# Patient Record
Sex: FEMALE | Race: WHITE | NOT HISPANIC OR LATINO | Employment: OTHER | URBAN - METROPOLITAN AREA
[De-identification: names, ages, dates, MRNs, and addresses within clinical notes are randomized per-mention and may not be internally consistent; named-entity substitution may affect disease eponyms.]

---

## 2017-01-09 ENCOUNTER — GENERIC CONVERSION - ENCOUNTER (OUTPATIENT)
Dept: OTHER | Facility: OTHER | Age: 82
End: 2017-01-09

## 2017-02-07 ENCOUNTER — ALLSCRIPTS OFFICE VISIT (OUTPATIENT)
Dept: OTHER | Facility: OTHER | Age: 82
End: 2017-02-07

## 2017-02-07 DIAGNOSIS — R49.0 DYSPHONIA: ICD-10-CM

## 2017-02-07 DIAGNOSIS — R13.10 DYSPHAGIA: ICD-10-CM

## 2017-02-21 ENCOUNTER — ALLSCRIPTS OFFICE VISIT (OUTPATIENT)
Dept: OTHER | Facility: OTHER | Age: 82
End: 2017-02-21

## 2017-03-21 ENCOUNTER — ALLSCRIPTS OFFICE VISIT (OUTPATIENT)
Dept: OTHER | Facility: OTHER | Age: 82
End: 2017-03-21

## 2017-04-01 ENCOUNTER — TRANSCRIBE ORDERS (OUTPATIENT)
Dept: ADMINISTRATIVE | Facility: HOSPITAL | Age: 82
End: 2017-04-01

## 2017-04-01 ENCOUNTER — APPOINTMENT (OUTPATIENT)
Dept: LAB | Facility: MEDICAL CENTER | Age: 82
End: 2017-04-01
Payer: MEDICARE

## 2017-04-01 DIAGNOSIS — E03.9 UNSPECIFIED HYPOTHYROIDISM: Primary | ICD-10-CM

## 2017-04-01 DIAGNOSIS — E03.9 UNSPECIFIED HYPOTHYROIDISM: ICD-10-CM

## 2017-04-01 LAB
T4 FREE SERPL-MCNC: 1.41 NG/DL (ref 0.76–1.46)
TSH SERPL DL<=0.05 MIU/L-ACNC: 2.1 UIU/ML (ref 0.36–3.74)

## 2017-04-01 PROCEDURE — 36415 COLL VENOUS BLD VENIPUNCTURE: CPT

## 2017-04-01 PROCEDURE — 84443 ASSAY THYROID STIM HORMONE: CPT

## 2017-04-01 PROCEDURE — 84439 ASSAY OF FREE THYROXINE: CPT

## 2017-04-11 ENCOUNTER — ALLSCRIPTS OFFICE VISIT (OUTPATIENT)
Dept: OTHER | Facility: OTHER | Age: 82
End: 2017-04-11

## 2017-05-09 ENCOUNTER — ALLSCRIPTS OFFICE VISIT (OUTPATIENT)
Dept: OTHER | Facility: OTHER | Age: 82
End: 2017-05-09

## 2017-06-01 ENCOUNTER — GENERIC CONVERSION - ENCOUNTER (OUTPATIENT)
Dept: OTHER | Facility: OTHER | Age: 82
End: 2017-06-01

## 2017-06-29 ENCOUNTER — OFFICE VISIT (OUTPATIENT)
Dept: URGENT CARE | Facility: MEDICAL CENTER | Age: 82
End: 2017-06-29
Payer: MEDICARE

## 2017-06-29 PROCEDURE — 99213 OFFICE O/P EST LOW 20 MIN: CPT

## 2017-06-29 PROCEDURE — G0463 HOSPITAL OUTPT CLINIC VISIT: HCPCS

## 2017-07-18 ENCOUNTER — ALLSCRIPTS OFFICE VISIT (OUTPATIENT)
Dept: OTHER | Facility: OTHER | Age: 82
End: 2017-07-18

## 2017-08-04 ENCOUNTER — HOSPITAL ENCOUNTER (INPATIENT)
Facility: HOSPITAL | Age: 82
LOS: 4 days | Discharge: NURSING, REHAB OR LONG-TERM CARE | DRG: 310 | End: 2017-08-08
Attending: EMERGENCY MEDICINE | Admitting: ANESTHESIOLOGY
Payer: MEDICARE

## 2017-08-04 ENCOUNTER — APPOINTMENT (EMERGENCY)
Dept: RADIOLOGY | Facility: HOSPITAL | Age: 82
DRG: 310 | End: 2017-08-04
Payer: MEDICARE

## 2017-08-04 DIAGNOSIS — IMO0002 ULCERATION: ICD-10-CM

## 2017-08-04 DIAGNOSIS — I48.91 ATRIAL FIBRILLATION WITH RAPID VENTRICULAR RESPONSE (HCC): ICD-10-CM

## 2017-08-04 DIAGNOSIS — R06.02 SOB (SHORTNESS OF BREATH): Primary | ICD-10-CM

## 2017-08-04 LAB
ALBUMIN SERPL BCP-MCNC: 3.2 G/DL (ref 3.5–5)
ALP SERPL-CCNC: 81 U/L (ref 46–116)
ALT SERPL W P-5'-P-CCNC: 20 U/L (ref 12–78)
ANION GAP SERPL CALCULATED.3IONS-SCNC: 6 MMOL/L (ref 4–13)
APTT PPP: 28 SECONDS (ref 23–35)
APTT PPP: 30 SECONDS (ref 23–35)
AST SERPL W P-5'-P-CCNC: 15 U/L (ref 5–45)
BACTERIA UR QL AUTO: ABNORMAL /HPF
BASOPHILS # BLD AUTO: 0.02 THOUSANDS/ΜL (ref 0–0.1)
BASOPHILS NFR BLD AUTO: 0 % (ref 0–1)
BILIRUB SERPL-MCNC: 0.5 MG/DL (ref 0.2–1)
BILIRUB UR QL STRIP: ABNORMAL
BUN SERPL-MCNC: 14 MG/DL (ref 5–25)
CALCIUM SERPL-MCNC: 9 MG/DL (ref 8.3–10.1)
CHLORIDE SERPL-SCNC: 105 MMOL/L (ref 100–108)
CLARITY UR: ABNORMAL
CO2 SERPL-SCNC: 29 MMOL/L (ref 21–32)
COLOR UR: YELLOW
CREAT SERPL-MCNC: 0.6 MG/DL (ref 0.6–1.3)
EOSINOPHIL # BLD AUTO: 0.09 THOUSAND/ΜL (ref 0–0.61)
EOSINOPHIL NFR BLD AUTO: 1 % (ref 0–6)
ERYTHROCYTE [DISTWIDTH] IN BLOOD BY AUTOMATED COUNT: 13.8 % (ref 11.6–15.1)
ERYTHROCYTE [DISTWIDTH] IN BLOOD BY AUTOMATED COUNT: 13.9 % (ref 11.6–15.1)
GFR SERPL CREATININE-BSD FRML MDRD: 81 ML/MIN/1.73SQ M
GLUCOSE SERPL-MCNC: 111 MG/DL (ref 65–140)
GLUCOSE UR STRIP-MCNC: NEGATIVE MG/DL
HCT VFR BLD AUTO: 37.2 % (ref 34.8–46.1)
HCT VFR BLD AUTO: 37.2 % (ref 34.8–46.1)
HGB BLD-MCNC: 11.6 G/DL (ref 11.5–15.4)
HGB BLD-MCNC: 11.6 G/DL (ref 11.5–15.4)
HGB UR QL STRIP.AUTO: NEGATIVE
HOLD SPECIMEN: NORMAL
INR PPP: 1.03 (ref 0.86–1.16)
INR PPP: 1.05 (ref 0.86–1.16)
KETONES UR STRIP-MCNC: ABNORMAL MG/DL
LEUKOCYTE ESTERASE UR QL STRIP: ABNORMAL
LYMPHOCYTES # BLD AUTO: 1.99 THOUSANDS/ΜL (ref 0.6–4.47)
LYMPHOCYTES NFR BLD AUTO: 29 % (ref 14–44)
MAGNESIUM SERPL-MCNC: 1.9 MG/DL (ref 1.6–2.6)
MCH RBC QN AUTO: 30 PG (ref 26.8–34.3)
MCH RBC QN AUTO: 30 PG (ref 26.8–34.3)
MCHC RBC AUTO-ENTMCNC: 31.2 G/DL (ref 31.4–37.4)
MCHC RBC AUTO-ENTMCNC: 31.2 G/DL (ref 31.4–37.4)
MCV RBC AUTO: 96 FL (ref 82–98)
MCV RBC AUTO: 96 FL (ref 82–98)
MONOCYTES # BLD AUTO: 0.61 THOUSAND/ΜL (ref 0.17–1.22)
MONOCYTES NFR BLD AUTO: 9 % (ref 4–12)
NEUTROPHILS # BLD AUTO: 4.11 THOUSANDS/ΜL (ref 1.85–7.62)
NEUTS SEG NFR BLD AUTO: 60 % (ref 43–75)
NITRITE UR QL STRIP: NEGATIVE
NON-SQ EPI CELLS URNS QL MICRO: ABNORMAL /HPF
NRBC BLD AUTO-RTO: 0 /100 WBCS
NT-PROBNP SERPL-MCNC: 1088 PG/ML
PH UR STRIP.AUTO: 5.5 [PH] (ref 4.5–8)
PHOSPHATE SERPL-MCNC: 3.9 MG/DL (ref 2.3–4.1)
PLATELET # BLD AUTO: 197 THOUSANDS/UL (ref 149–390)
PLATELET # BLD AUTO: 198 THOUSANDS/UL (ref 149–390)
PMV BLD AUTO: 10.5 FL (ref 8.9–12.7)
PMV BLD AUTO: 10.8 FL (ref 8.9–12.7)
POTASSIUM SERPL-SCNC: 3.8 MMOL/L (ref 3.5–5.3)
PROT SERPL-MCNC: 6.5 G/DL (ref 6.4–8.2)
PROT UR STRIP-MCNC: ABNORMAL MG/DL
PROTHROMBIN TIME: 13.7 SECONDS (ref 12.1–14.4)
PROTHROMBIN TIME: 13.9 SECONDS (ref 12.1–14.4)
RBC # BLD AUTO: 3.87 MILLION/UL (ref 3.81–5.12)
RBC # BLD AUTO: 3.87 MILLION/UL (ref 3.81–5.12)
RBC #/AREA URNS AUTO: ABNORMAL /HPF
SODIUM SERPL-SCNC: 140 MMOL/L (ref 136–145)
SP GR UR STRIP.AUTO: >=1.03 (ref 1–1.03)
TROPONIN I SERPL-MCNC: 0.02 NG/ML
TSH SERPL DL<=0.05 MIU/L-ACNC: 1.52 UIU/ML (ref 0.36–3.74)
UROBILINOGEN UR QL STRIP.AUTO: 0.2 E.U./DL
WBC # BLD AUTO: 6.1 THOUSAND/UL (ref 4.31–10.16)
WBC # BLD AUTO: 6.84 THOUSAND/UL (ref 4.31–10.16)
WBC #/AREA URNS AUTO: ABNORMAL /HPF

## 2017-08-04 PROCEDURE — 84100 ASSAY OF PHOSPHORUS: CPT | Performed by: EMERGENCY MEDICINE

## 2017-08-04 PROCEDURE — 84443 ASSAY THYROID STIM HORMONE: CPT | Performed by: EMERGENCY MEDICINE

## 2017-08-04 PROCEDURE — 85610 PROTHROMBIN TIME: CPT | Performed by: EMERGENCY MEDICINE

## 2017-08-04 PROCEDURE — 85610 PROTHROMBIN TIME: CPT | Performed by: NURSE PRACTITIONER

## 2017-08-04 PROCEDURE — 83880 ASSAY OF NATRIURETIC PEPTIDE: CPT | Performed by: EMERGENCY MEDICINE

## 2017-08-04 PROCEDURE — 93005 ELECTROCARDIOGRAM TRACING: CPT | Performed by: NURSE PRACTITIONER

## 2017-08-04 PROCEDURE — 93005 ELECTROCARDIOGRAM TRACING: CPT

## 2017-08-04 PROCEDURE — 99285 EMERGENCY DEPT VISIT HI MDM: CPT

## 2017-08-04 PROCEDURE — 84484 ASSAY OF TROPONIN QUANT: CPT | Performed by: NURSE PRACTITIONER

## 2017-08-04 PROCEDURE — 80053 COMPREHEN METABOLIC PANEL: CPT

## 2017-08-04 PROCEDURE — 85730 THROMBOPLASTIN TIME PARTIAL: CPT | Performed by: EMERGENCY MEDICINE

## 2017-08-04 PROCEDURE — 84484 ASSAY OF TROPONIN QUANT: CPT | Performed by: EMERGENCY MEDICINE

## 2017-08-04 PROCEDURE — 85025 COMPLETE CBC W/AUTO DIFF WBC: CPT

## 2017-08-04 PROCEDURE — 71010 HB CHEST X-RAY 1 VIEW FRONTAL (PORTABLE): CPT

## 2017-08-04 PROCEDURE — 83735 ASSAY OF MAGNESIUM: CPT | Performed by: EMERGENCY MEDICINE

## 2017-08-04 PROCEDURE — 81001 URINALYSIS AUTO W/SCOPE: CPT | Performed by: EMERGENCY MEDICINE

## 2017-08-04 PROCEDURE — 85730 THROMBOPLASTIN TIME PARTIAL: CPT | Performed by: NURSE PRACTITIONER

## 2017-08-04 PROCEDURE — 36415 COLL VENOUS BLD VENIPUNCTURE: CPT

## 2017-08-04 PROCEDURE — 96376 TX/PRO/DX INJ SAME DRUG ADON: CPT

## 2017-08-04 PROCEDURE — 85027 COMPLETE CBC AUTOMATED: CPT | Performed by: NURSE PRACTITIONER

## 2017-08-04 PROCEDURE — 96365 THER/PROPH/DIAG IV INF INIT: CPT

## 2017-08-04 RX ORDER — SODIUM CHLORIDE 9 MG/ML
100 INJECTION, SOLUTION INTRAVENOUS CONTINUOUS
Status: DISCONTINUED | OUTPATIENT
Start: 2017-08-04 | End: 2017-08-05

## 2017-08-04 RX ORDER — HEPARIN SODIUM 10000 [USP'U]/100ML
3-20 INJECTION, SOLUTION INTRAVENOUS
Status: DISCONTINUED | OUTPATIENT
Start: 2017-08-04 | End: 2017-08-05

## 2017-08-04 RX ORDER — ASPIRIN 81 MG/1
162 TABLET ORAL DAILY
Status: DISCONTINUED | OUTPATIENT
Start: 2017-08-04 | End: 2017-08-08 | Stop reason: HOSPADM

## 2017-08-04 RX ORDER — AMLODIPINE BESYLATE 5 MG/1
10 TABLET ORAL DAILY
Status: DISCONTINUED | OUTPATIENT
Start: 2017-08-05 | End: 2017-08-04

## 2017-08-04 RX ORDER — BRIMONIDINE TARTRATE, TIMOLOL MALEATE 2; 5 MG/ML; MG/ML
1 SOLUTION/ DROPS OPHTHALMIC 2 TIMES DAILY
Status: DISCONTINUED | OUTPATIENT
Start: 2017-08-04 | End: 2017-08-08 | Stop reason: HOSPADM

## 2017-08-04 RX ORDER — BENZONATATE 100 MG/1
100 CAPSULE ORAL 3 TIMES DAILY PRN
Status: DISCONTINUED | OUTPATIENT
Start: 2017-08-04 | End: 2017-08-08 | Stop reason: HOSPADM

## 2017-08-04 RX ORDER — OXYCODONE HCL 5 MG/5 ML
5 SOLUTION, ORAL ORAL EVERY 6 HOURS PRN
Status: DISCONTINUED | OUTPATIENT
Start: 2017-08-04 | End: 2017-08-08 | Stop reason: HOSPADM

## 2017-08-04 RX ORDER — BENZONATATE 100 MG/1
100 CAPSULE ORAL 3 TIMES DAILY PRN
COMMUNITY
End: 2017-09-05 | Stop reason: HOSPADM

## 2017-08-04 RX ORDER — HYDROCODONE POLISTIREX AND CHLORPHENIRAMINE POLISTIREX 10; 8 MG/5ML; MG/5ML
5 SUSPENSION, EXTENDED RELEASE ORAL EVERY 12 HOURS PRN
Status: DISCONTINUED | OUTPATIENT
Start: 2017-08-04 | End: 2017-08-08 | Stop reason: HOSPADM

## 2017-08-04 RX ORDER — LORAZEPAM 2 MG/ML
1 INJECTION INTRAMUSCULAR ONCE
Status: COMPLETED | OUTPATIENT
Start: 2017-08-04 | End: 2017-08-04

## 2017-08-04 RX ORDER — HEPARIN SODIUM 1000 [USP'U]/ML
4000 INJECTION, SOLUTION INTRAVENOUS; SUBCUTANEOUS AS NEEDED
Status: DISCONTINUED | OUTPATIENT
Start: 2017-08-04 | End: 2017-08-05

## 2017-08-04 RX ORDER — HEPARIN SODIUM 1000 [USP'U]/ML
2000 INJECTION, SOLUTION INTRAVENOUS; SUBCUTANEOUS AS NEEDED
Status: DISCONTINUED | OUTPATIENT
Start: 2017-08-04 | End: 2017-08-05

## 2017-08-04 RX ORDER — HYDROCODONE BITARTRATE AND ACETAMINOPHEN 5; 325 MG/1; MG/1
1 TABLET ORAL EVERY 6 HOURS PRN
Status: DISCONTINUED | OUTPATIENT
Start: 2017-08-04 | End: 2017-08-04

## 2017-08-04 RX ORDER — NORTRIPTYLINE HYDROCHLORIDE 25 MG/1
25 CAPSULE ORAL
Status: DISCONTINUED | OUTPATIENT
Start: 2017-08-04 | End: 2017-08-08 | Stop reason: HOSPADM

## 2017-08-04 RX ORDER — LEVOTHYROXINE SODIUM 0.12 MG/1
125 TABLET ORAL DAILY
Status: DISCONTINUED | OUTPATIENT
Start: 2017-08-05 | End: 2017-08-08 | Stop reason: HOSPADM

## 2017-08-04 RX ORDER — DILTIAZEM HYDROCHLORIDE 5 MG/ML
20 INJECTION INTRAVENOUS ONCE
Status: COMPLETED | OUTPATIENT
Start: 2017-08-04 | End: 2017-08-04

## 2017-08-04 RX ORDER — BRIMONIDINE TARTRATE, TIMOLOL MALEATE 2; 5 MG/ML; MG/ML
1 SOLUTION/ DROPS OPHTHALMIC 2 TIMES DAILY
Status: DISCONTINUED | OUTPATIENT
Start: 2017-08-04 | End: 2017-08-04

## 2017-08-04 RX ORDER — MIRTAZAPINE 15 MG/1
15 TABLET, FILM COATED ORAL
Status: DISCONTINUED | OUTPATIENT
Start: 2017-08-04 | End: 2017-08-08 | Stop reason: HOSPADM

## 2017-08-04 RX ADMIN — ASPIRIN 162 MG: 81 TABLET, COATED ORAL at 17:39

## 2017-08-04 RX ADMIN — DILTIAZEM HYDROCHLORIDE 30 MG: 30 TABLET, FILM COATED ORAL at 17:25

## 2017-08-04 RX ADMIN — HYDROCODONE POLISTIREX AND CHLORPHENIRAMINE POLISTIREX 5 ML: 10; 8 SUSPENSION, EXTENDED RELEASE ORAL at 21:53

## 2017-08-04 RX ADMIN — HEPARIN SODIUM AND DEXTROSE 12 UNITS/KG/HR: 10000; 5 INJECTION INTRAVENOUS at 17:25

## 2017-08-04 RX ADMIN — LORAZEPAM 1 MG: 2 INJECTION INTRAMUSCULAR; INTRAVENOUS at 15:03

## 2017-08-04 RX ADMIN — DILTIAZEM HYDROCHLORIDE 30 MG: 30 TABLET, FILM COATED ORAL at 14:11

## 2017-08-04 RX ADMIN — SODIUM CHLORIDE 100 ML/HR: 0.9 INJECTION, SOLUTION INTRAVENOUS at 17:17

## 2017-08-04 RX ADMIN — DILTIAZEM HYDROCHLORIDE 20 MG: 5 INJECTION INTRAVENOUS at 11:58

## 2017-08-04 RX ADMIN — BRIMONIDINE TARTRATE, TIMOLOL MALEATE 1 DROP: 2; 5 SOLUTION/ DROPS OPHTHALMIC at 22:05

## 2017-08-04 RX ADMIN — DILTIAZEM HYDROCHLORIDE 5 MG/HR: 5 INJECTION INTRAVENOUS at 12:37

## 2017-08-05 LAB
ANION GAP SERPL CALCULATED.3IONS-SCNC: 9 MMOL/L (ref 4–13)
APTT PPP: 53 SECONDS (ref 23–35)
APTT PPP: 53 SECONDS (ref 23–35)
ATRIAL RATE: 150 BPM
ATRIAL RATE: 77 BPM
BUN SERPL-MCNC: 14 MG/DL (ref 5–25)
CALCIUM SERPL-MCNC: 8.4 MG/DL (ref 8.3–10.1)
CHLORIDE SERPL-SCNC: 106 MMOL/L (ref 100–108)
CO2 SERPL-SCNC: 24 MMOL/L (ref 21–32)
CREAT SERPL-MCNC: 0.55 MG/DL (ref 0.6–1.3)
ERYTHROCYTE [DISTWIDTH] IN BLOOD BY AUTOMATED COUNT: 13.7 % (ref 11.6–15.1)
GFR SERPL CREATININE-BSD FRML MDRD: 83 ML/MIN/1.73SQ M
GLUCOSE SERPL-MCNC: 101 MG/DL (ref 65–140)
HCT VFR BLD AUTO: 35.2 % (ref 34.8–46.1)
HGB BLD-MCNC: 10.6 G/DL (ref 11.5–15.4)
MAGNESIUM SERPL-MCNC: 2.1 MG/DL (ref 1.6–2.6)
MCH RBC QN AUTO: 29.9 PG (ref 26.8–34.3)
MCHC RBC AUTO-ENTMCNC: 30.1 G/DL (ref 31.4–37.4)
MCV RBC AUTO: 99 FL (ref 82–98)
P AXIS: 51 DEGREES
PLATELET # BLD AUTO: 149 THOUSANDS/UL (ref 149–390)
PMV BLD AUTO: 10.9 FL (ref 8.9–12.7)
POTASSIUM SERPL-SCNC: 3.7 MMOL/L (ref 3.5–5.3)
PR INTERVAL: 278 MS
QRS AXIS: 103 DEGREES
QRS AXIS: 109 DEGREES
QRSD INTERVAL: 108 MS
QRSD INTERVAL: 110 MS
QT INTERVAL: 316 MS
QT INTERVAL: 408 MS
QTC INTERVAL: 461 MS
QTC INTERVAL: 480 MS
RBC # BLD AUTO: 3.54 MILLION/UL (ref 3.81–5.12)
SODIUM SERPL-SCNC: 139 MMOL/L (ref 136–145)
T WAVE AXIS: 50 DEGREES
T WAVE AXIS: 52 DEGREES
VENTRICULAR RATE: 139 BPM
VENTRICULAR RATE: 77 BPM
WBC # BLD AUTO: 4.99 THOUSAND/UL (ref 4.31–10.16)

## 2017-08-05 PROCEDURE — 85027 COMPLETE CBC AUTOMATED: CPT | Performed by: PHYSICIAN ASSISTANT

## 2017-08-05 PROCEDURE — 80048 BASIC METABOLIC PNL TOTAL CA: CPT | Performed by: PHYSICIAN ASSISTANT

## 2017-08-05 PROCEDURE — 85730 THROMBOPLASTIN TIME PARTIAL: CPT | Performed by: ANESTHESIOLOGY

## 2017-08-05 PROCEDURE — 83735 ASSAY OF MAGNESIUM: CPT | Performed by: PHYSICIAN ASSISTANT

## 2017-08-05 PROCEDURE — 85730 THROMBOPLASTIN TIME PARTIAL: CPT | Performed by: PHYSICIAN ASSISTANT

## 2017-08-05 RX ORDER — POLYETHYLENE GLYCOL 3350 17 G/17G
17 POWDER, FOR SOLUTION ORAL DAILY PRN
Status: DISCONTINUED | OUTPATIENT
Start: 2017-08-05 | End: 2017-08-08 | Stop reason: HOSPADM

## 2017-08-05 RX ADMIN — POLYETHYLENE GLYCOL 3350 17 G: 17 POWDER, FOR SOLUTION ORAL at 12:33

## 2017-08-05 RX ADMIN — DILTIAZEM HYDROCHLORIDE 30 MG: 30 TABLET, FILM COATED ORAL at 06:27

## 2017-08-05 RX ADMIN — BRIMONIDINE TARTRATE, TIMOLOL MALEATE 1 DROP: 2; 5 SOLUTION/ DROPS OPHTHALMIC at 17:42

## 2017-08-05 RX ADMIN — DILTIAZEM HYDROCHLORIDE 30 MG: 30 TABLET, FILM COATED ORAL at 17:42

## 2017-08-05 RX ADMIN — OXYCODONE HYDROCHLORIDE 5 MG: 5 SOLUTION ORAL at 11:31

## 2017-08-05 RX ADMIN — NORTRIPTYLINE HYDROCHLORIDE 25 MG: 25 CAPSULE ORAL at 21:16

## 2017-08-05 RX ADMIN — DILTIAZEM HYDROCHLORIDE 30 MG: 30 TABLET, FILM COATED ORAL at 00:15

## 2017-08-05 RX ADMIN — BRIMONIDINE TARTRATE, TIMOLOL MALEATE 1 DROP: 2; 5 SOLUTION/ DROPS OPHTHALMIC at 08:18

## 2017-08-05 RX ADMIN — ASPIRIN 162 MG: 81 TABLET, COATED ORAL at 08:17

## 2017-08-05 RX ADMIN — SODIUM CHLORIDE 100 ML/HR: 0.9 INJECTION, SOLUTION INTRAVENOUS at 03:13

## 2017-08-05 RX ADMIN — MIRTAZAPINE 15 MG: 15 TABLET, FILM COATED ORAL at 21:16

## 2017-08-05 RX ADMIN — DILTIAZEM HYDROCHLORIDE 30 MG: 30 TABLET, FILM COATED ORAL at 11:31

## 2017-08-05 RX ADMIN — LEVOTHYROXINE SODIUM 125 MCG: 125 TABLET ORAL at 06:27

## 2017-08-05 RX ADMIN — HEPARIN SODIUM 2000 UNITS: 1000 INJECTION, SOLUTION INTRAVENOUS; SUBCUTANEOUS at 00:57

## 2017-08-06 ENCOUNTER — APPOINTMENT (INPATIENT)
Dept: NON INVASIVE DIAGNOSTICS | Facility: HOSPITAL | Age: 82
DRG: 310 | End: 2017-08-06
Payer: MEDICARE

## 2017-08-06 LAB
ANION GAP SERPL CALCULATED.3IONS-SCNC: 7 MMOL/L (ref 4–13)
BUN SERPL-MCNC: 12 MG/DL (ref 5–25)
CALCIUM SERPL-MCNC: 9.1 MG/DL (ref 8.3–10.1)
CHLORIDE SERPL-SCNC: 105 MMOL/L (ref 100–108)
CO2 SERPL-SCNC: 28 MMOL/L (ref 21–32)
CREAT SERPL-MCNC: 0.54 MG/DL (ref 0.6–1.3)
GFR SERPL CREATININE-BSD FRML MDRD: 83 ML/MIN/1.73SQ M
GLUCOSE SERPL-MCNC: 105 MG/DL (ref 65–140)
MAGNESIUM SERPL-MCNC: 1.9 MG/DL (ref 1.6–2.6)
POTASSIUM SERPL-SCNC: 3.6 MMOL/L (ref 3.5–5.3)
SODIUM SERPL-SCNC: 140 MMOL/L (ref 136–145)

## 2017-08-06 PROCEDURE — 80048 BASIC METABOLIC PNL TOTAL CA: CPT | Performed by: NURSE PRACTITIONER

## 2017-08-06 PROCEDURE — 92610 EVALUATE SWALLOWING FUNCTION: CPT

## 2017-08-06 PROCEDURE — 93306 TTE W/DOPPLER COMPLETE: CPT

## 2017-08-06 PROCEDURE — 94664 DEMO&/EVAL PT USE INHALER: CPT

## 2017-08-06 PROCEDURE — 94760 N-INVAS EAR/PLS OXIMETRY 1: CPT

## 2017-08-06 PROCEDURE — 83735 ASSAY OF MAGNESIUM: CPT | Performed by: NURSE PRACTITIONER

## 2017-08-06 RX ORDER — SODIUM CHLORIDE FOR INHALATION 0.9 %
3 VIAL, NEBULIZER (ML) INHALATION EVERY 8 HOURS PRN
Status: DISCONTINUED | OUTPATIENT
Start: 2017-08-06 | End: 2017-08-08 | Stop reason: HOSPADM

## 2017-08-06 RX ORDER — DILTIAZEM HYDROCHLORIDE 5 MG/ML
10 INJECTION INTRAVENOUS ONCE
Status: COMPLETED | OUTPATIENT
Start: 2017-08-06 | End: 2017-08-06

## 2017-08-06 RX ORDER — MAGNESIUM SULFATE HEPTAHYDRATE 40 MG/ML
2 INJECTION, SOLUTION INTRAVENOUS ONCE
Status: COMPLETED | OUTPATIENT
Start: 2017-08-06 | End: 2017-08-07

## 2017-08-06 RX ORDER — LEVALBUTEROL 1.25 MG/.5ML
1.25 SOLUTION, CONCENTRATE RESPIRATORY (INHALATION) EVERY 8 HOURS PRN
Status: DISCONTINUED | OUTPATIENT
Start: 2017-08-06 | End: 2017-08-07

## 2017-08-06 RX ADMIN — LEVOTHYROXINE SODIUM 125 MCG: 125 TABLET ORAL at 05:33

## 2017-08-06 RX ADMIN — DILTIAZEM HYDROCHLORIDE 10 MG: 5 INJECTION INTRAVENOUS at 22:22

## 2017-08-06 RX ADMIN — HYDROCODONE POLISTIREX AND CHLORPHENIRAMINE POLISTIREX 5 ML: 10; 8 SUSPENSION, EXTENDED RELEASE ORAL at 23:05

## 2017-08-06 RX ADMIN — LEVALBUTEROL HYDROCHLORIDE 1.25 MG: 1.25 SOLUTION, CONCENTRATE RESPIRATORY (INHALATION) at 22:36

## 2017-08-06 RX ADMIN — MAGNESIUM SULFATE HEPTAHYDRATE 2 G: 40 INJECTION, SOLUTION INTRAVENOUS at 22:46

## 2017-08-06 RX ADMIN — BRIMONIDINE TARTRATE, TIMOLOL MALEATE 1 DROP: 2; 5 SOLUTION/ DROPS OPHTHALMIC at 17:00

## 2017-08-06 RX ADMIN — OXYCODONE HYDROCHLORIDE 5 MG: 5 SOLUTION ORAL at 16:56

## 2017-08-06 RX ADMIN — DILTIAZEM HYDROCHLORIDE 30 MG: 30 TABLET, FILM COATED ORAL at 12:13

## 2017-08-06 RX ADMIN — ENOXAPARIN SODIUM 40 MG: 40 INJECTION SUBCUTANEOUS at 14:13

## 2017-08-06 RX ADMIN — BRIMONIDINE TARTRATE, TIMOLOL MALEATE 1 DROP: 2; 5 SOLUTION/ DROPS OPHTHALMIC at 08:32

## 2017-08-06 RX ADMIN — LIDOCAINE HYDROCHLORIDE 10 ML: 20 SOLUTION ORAL; TOPICAL at 19:44

## 2017-08-06 RX ADMIN — DILTIAZEM HYDROCHLORIDE 30 MG: 30 TABLET, FILM COATED ORAL at 00:16

## 2017-08-06 RX ADMIN — HYDROCODONE POLISTIREX AND CHLORPHENIRAMINE POLISTIREX 5 ML: 10; 8 SUSPENSION, EXTENDED RELEASE ORAL at 00:20

## 2017-08-06 RX ADMIN — DILTIAZEM HYDROCHLORIDE 30 MG: 30 TABLET, FILM COATED ORAL at 23:05

## 2017-08-06 RX ADMIN — ASPIRIN 162 MG: 81 TABLET, COATED ORAL at 08:32

## 2017-08-06 RX ADMIN — DILTIAZEM HYDROCHLORIDE 30 MG: 30 TABLET, FILM COATED ORAL at 05:33

## 2017-08-06 RX ADMIN — ISODIUM CHLORIDE 3 ML: 0.03 SOLUTION RESPIRATORY (INHALATION) at 22:36

## 2017-08-06 RX ADMIN — MIRTAZAPINE 15 MG: 15 TABLET, FILM COATED ORAL at 23:00

## 2017-08-06 RX ADMIN — HYDROCODONE POLISTIREX AND CHLORPHENIRAMINE POLISTIREX 5 ML: 10; 8 SUSPENSION, EXTENDED RELEASE ORAL at 10:00

## 2017-08-06 RX ADMIN — DILTIAZEM HYDROCHLORIDE 30 MG: 30 TABLET, FILM COATED ORAL at 17:00

## 2017-08-06 RX ADMIN — POLYETHYLENE GLYCOL 3350 17 G: 17 POWDER, FOR SOLUTION ORAL at 12:13

## 2017-08-07 LAB
ANION GAP SERPL CALCULATED.3IONS-SCNC: 5 MMOL/L (ref 4–13)
ATRIAL RATE: 106 BPM
ATRIAL RATE: 77 BPM
BUN SERPL-MCNC: 10 MG/DL (ref 5–25)
CALCIUM SERPL-MCNC: 8.7 MG/DL (ref 8.3–10.1)
CHLORIDE SERPL-SCNC: 103 MMOL/L (ref 100–108)
CO2 SERPL-SCNC: 29 MMOL/L (ref 21–32)
CREAT SERPL-MCNC: 0.57 MG/DL (ref 0.6–1.3)
ERYTHROCYTE [DISTWIDTH] IN BLOOD BY AUTOMATED COUNT: 13.5 % (ref 11.6–15.1)
GFR SERPL CREATININE-BSD FRML MDRD: 82 ML/MIN/1.73SQ M
GLUCOSE SERPL-MCNC: 117 MG/DL (ref 65–140)
HCT VFR BLD AUTO: 34 % (ref 34.8–46.1)
HGB BLD-MCNC: 10.5 G/DL (ref 11.5–15.4)
MAGNESIUM SERPL-MCNC: 2.3 MG/DL (ref 1.6–2.6)
MCH RBC QN AUTO: 30 PG (ref 26.8–34.3)
MCHC RBC AUTO-ENTMCNC: 30.9 G/DL (ref 31.4–37.4)
MCV RBC AUTO: 97 FL (ref 82–98)
P AXIS: -77 DEGREES
PLATELET # BLD AUTO: 154 THOUSANDS/UL (ref 149–390)
PMV BLD AUTO: 10.7 FL (ref 8.9–12.7)
POTASSIUM SERPL-SCNC: 3.8 MMOL/L (ref 3.5–5.3)
PR INTERVAL: 144 MS
QRS AXIS: 110 DEGREES
QRS AXIS: 89 DEGREES
QRSD INTERVAL: 102 MS
QRSD INTERVAL: 120 MS
QT INTERVAL: 366 MS
QT INTERVAL: 398 MS
QTC INTERVAL: 450 MS
QTC INTERVAL: 486 MS
RBC # BLD AUTO: 3.5 MILLION/UL (ref 3.81–5.12)
SODIUM SERPL-SCNC: 137 MMOL/L (ref 136–145)
T WAVE AXIS: 41 DEGREES
T WAVE AXIS: 61 DEGREES
TROPONIN I SERPL-MCNC: <0.02 NG/ML
VENTRICULAR RATE: 106 BPM
VENTRICULAR RATE: 77 BPM
WBC # BLD AUTO: 6.68 THOUSAND/UL (ref 4.31–10.16)

## 2017-08-07 PROCEDURE — 83735 ASSAY OF MAGNESIUM: CPT | Performed by: NURSE PRACTITIONER

## 2017-08-07 PROCEDURE — 85027 COMPLETE CBC AUTOMATED: CPT | Performed by: INTERNAL MEDICINE

## 2017-08-07 PROCEDURE — 80048 BASIC METABOLIC PNL TOTAL CA: CPT | Performed by: NURSE PRACTITIONER

## 2017-08-07 PROCEDURE — 84484 ASSAY OF TROPONIN QUANT: CPT | Performed by: NURSE PRACTITIONER

## 2017-08-07 PROCEDURE — 93005 ELECTROCARDIOGRAM TRACING: CPT

## 2017-08-07 RX ORDER — DILTIAZEM HYDROCHLORIDE 60 MG/1
60 TABLET, FILM COATED ORAL EVERY 6 HOURS SCHEDULED
Status: DISCONTINUED | OUTPATIENT
Start: 2017-08-07 | End: 2017-08-07

## 2017-08-07 RX ORDER — DILTIAZEM HYDROCHLORIDE 60 MG/1
60 TABLET, FILM COATED ORAL EVERY 8 HOURS SCHEDULED
Status: DISCONTINUED | OUTPATIENT
Start: 2017-08-07 | End: 2017-08-08 | Stop reason: HOSPADM

## 2017-08-07 RX ORDER — SIMETHICONE 80 MG
80 TABLET,CHEWABLE ORAL EVERY 6 HOURS PRN
Status: DISCONTINUED | OUTPATIENT
Start: 2017-08-07 | End: 2017-08-08 | Stop reason: HOSPADM

## 2017-08-07 RX ORDER — LEVALBUTEROL 1.25 MG/.5ML
1.25 SOLUTION, CONCENTRATE RESPIRATORY (INHALATION) EVERY 6 HOURS PRN
Status: DISCONTINUED | OUTPATIENT
Start: 2017-08-07 | End: 2017-08-08 | Stop reason: HOSPADM

## 2017-08-07 RX ORDER — NYSTATIN 100000 [USP'U]/G
POWDER TOPICAL 2 TIMES DAILY
Status: DISCONTINUED | OUTPATIENT
Start: 2017-08-07 | End: 2017-08-08 | Stop reason: HOSPADM

## 2017-08-07 RX ORDER — FENTANYL CITRATE 50 UG/ML
25 INJECTION, SOLUTION INTRAMUSCULAR; INTRAVENOUS ONCE
Status: COMPLETED | OUTPATIENT
Start: 2017-08-07 | End: 2017-08-07

## 2017-08-07 RX ORDER — DOCUSATE SODIUM 100 MG/1
100 CAPSULE, LIQUID FILLED ORAL 2 TIMES DAILY
Status: DISCONTINUED | OUTPATIENT
Start: 2017-08-07 | End: 2017-08-08 | Stop reason: HOSPADM

## 2017-08-07 RX ADMIN — DILTIAZEM HYDROCHLORIDE 60 MG: 60 TABLET, FILM COATED ORAL at 11:28

## 2017-08-07 RX ADMIN — FENTANYL CITRATE 25 MCG: 50 INJECTION INTRAMUSCULAR; INTRAVENOUS at 02:37

## 2017-08-07 RX ADMIN — METOPROLOL TARTRATE 25 MG: 25 TABLET ORAL at 21:04

## 2017-08-07 RX ADMIN — ASPIRIN 162 MG: 81 TABLET, COATED ORAL at 09:38

## 2017-08-07 RX ADMIN — OXYCODONE HYDROCHLORIDE 5 MG: 5 SOLUTION ORAL at 09:52

## 2017-08-07 RX ADMIN — BENZONATATE 100 MG: 100 CAPSULE ORAL at 10:51

## 2017-08-07 RX ADMIN — LEVOTHYROXINE SODIUM 125 MCG: 125 TABLET ORAL at 06:08

## 2017-08-07 RX ADMIN — BENZONATATE 100 MG: 100 CAPSULE ORAL at 02:05

## 2017-08-07 RX ADMIN — NYSTATIN: 100000 POWDER TOPICAL at 09:38

## 2017-08-07 RX ADMIN — NORTRIPTYLINE HYDROCHLORIDE 25 MG: 25 CAPSULE ORAL at 21:04

## 2017-08-07 RX ADMIN — METOPROLOL TARTRATE 25 MG: 25 TABLET ORAL at 12:50

## 2017-08-07 RX ADMIN — DILTIAZEM HYDROCHLORIDE 30 MG: 30 TABLET, FILM COATED ORAL at 06:08

## 2017-08-07 RX ADMIN — BRIMONIDINE TARTRATE, TIMOLOL MALEATE 1 DROP: 2; 5 SOLUTION/ DROPS OPHTHALMIC at 18:03

## 2017-08-07 RX ADMIN — BRIMONIDINE TARTRATE, TIMOLOL MALEATE 1 DROP: 2; 5 SOLUTION/ DROPS OPHTHALMIC at 09:38

## 2017-08-07 RX ADMIN — LIDOCAINE HYDROCHLORIDE 15 ML: 20 SOLUTION ORAL; TOPICAL at 09:39

## 2017-08-07 RX ADMIN — LIDOCAINE HYDROCHLORIDE 15 ML: 20 SOLUTION ORAL; TOPICAL at 01:59

## 2017-08-07 RX ADMIN — DOCUSATE SODIUM 100 MG: 100 CAPSULE, LIQUID FILLED ORAL at 10:51

## 2017-08-07 RX ADMIN — SIMETHICONE CHEW TAB 80 MG 80 MG: 80 TABLET ORAL at 19:20

## 2017-08-07 RX ADMIN — ENOXAPARIN SODIUM 40 MG: 40 INJECTION SUBCUTANEOUS at 09:37

## 2017-08-07 RX ADMIN — OXYCODONE HYDROCHLORIDE 5 MG: 5 SOLUTION ORAL at 19:20

## 2017-08-07 RX ADMIN — SIMETHICONE CHEW TAB 80 MG 80 MG: 80 TABLET ORAL at 02:05

## 2017-08-07 RX ADMIN — MIRTAZAPINE 15 MG: 15 TABLET, FILM COATED ORAL at 21:04

## 2017-08-07 RX ADMIN — DILTIAZEM HYDROCHLORIDE 60 MG: 60 TABLET, FILM COATED ORAL at 21:04

## 2017-08-08 VITALS
WEIGHT: 184.3 LBS | DIASTOLIC BLOOD PRESSURE: 58 MMHG | HEART RATE: 77 BPM | RESPIRATION RATE: 18 BRPM | SYSTOLIC BLOOD PRESSURE: 123 MMHG | OXYGEN SATURATION: 98 % | BODY MASS INDEX: 31.47 KG/M2 | TEMPERATURE: 98.8 F | HEIGHT: 64 IN

## 2017-08-08 PROCEDURE — G8978 MOBILITY CURRENT STATUS: HCPCS

## 2017-08-08 PROCEDURE — G8979 MOBILITY GOAL STATUS: HCPCS

## 2017-08-08 PROCEDURE — 97163 PT EVAL HIGH COMPLEX 45 MIN: CPT

## 2017-08-08 RX ORDER — DILTIAZEM HYDROCHLORIDE 180 MG/1
180 CAPSULE, COATED, EXTENDED RELEASE ORAL DAILY
Qty: 30 CAPSULE | Refills: 0 | Status: SHIPPED | OUTPATIENT
Start: 2017-08-08

## 2017-08-08 RX ORDER — DOCUSATE SODIUM 100 MG/1
100 CAPSULE, LIQUID FILLED ORAL 2 TIMES DAILY
Qty: 10 CAPSULE | Refills: 0 | Status: SHIPPED | OUTPATIENT
Start: 2017-08-08

## 2017-08-08 RX ORDER — SIMETHICONE 80 MG
80 TABLET,CHEWABLE ORAL EVERY 6 HOURS PRN
Qty: 30 TABLET | Refills: 0 | Status: SHIPPED | OUTPATIENT
Start: 2017-08-08 | End: 2017-09-05 | Stop reason: HOSPADM

## 2017-08-08 RX ORDER — ASPIRIN 81 MG/1
162 TABLET ORAL DAILY
Qty: 60 TABLET | Refills: 0 | Status: SHIPPED | OUTPATIENT
Start: 2017-08-08 | End: 2017-09-05 | Stop reason: HOSPADM

## 2017-08-08 RX ORDER — POLYETHYLENE GLYCOL 3350 17 G/17G
17 POWDER, FOR SOLUTION ORAL DAILY PRN
Qty: 14 EACH | Refills: 0 | Status: SHIPPED | OUTPATIENT
Start: 2017-08-08 | End: 2017-09-05 | Stop reason: HOSPADM

## 2017-08-08 RX ORDER — NYSTATIN 100000 [USP'U]/G
POWDER TOPICAL 2 TIMES DAILY
Qty: 15 G | Refills: 0 | Status: SHIPPED | OUTPATIENT
Start: 2017-08-08

## 2017-08-08 RX ADMIN — LEVOTHYROXINE SODIUM 125 MCG: 125 TABLET ORAL at 05:49

## 2017-08-08 RX ADMIN — DOCUSATE SODIUM 100 MG: 100 CAPSULE, LIQUID FILLED ORAL at 08:36

## 2017-08-08 RX ADMIN — ENOXAPARIN SODIUM 40 MG: 40 INJECTION SUBCUTANEOUS at 08:37

## 2017-08-08 RX ADMIN — DILTIAZEM HYDROCHLORIDE 60 MG: 60 TABLET, FILM COATED ORAL at 05:49

## 2017-08-08 RX ADMIN — METOPROLOL TARTRATE 25 MG: 25 TABLET ORAL at 08:37

## 2017-08-08 RX ADMIN — NYSTATIN: 100000 POWDER TOPICAL at 08:39

## 2017-08-08 RX ADMIN — ASPIRIN 162 MG: 81 TABLET, COATED ORAL at 08:37

## 2017-08-08 RX ADMIN — BRIMONIDINE TARTRATE, TIMOLOL MALEATE 1 DROP: 2; 5 SOLUTION/ DROPS OPHTHALMIC at 08:39

## 2017-09-01 ENCOUNTER — APPOINTMENT (EMERGENCY)
Dept: CT IMAGING | Facility: HOSPITAL | Age: 82
DRG: 177 | End: 2017-09-01
Payer: MEDICARE

## 2017-09-01 ENCOUNTER — HOSPITAL ENCOUNTER (INPATIENT)
Facility: HOSPITAL | Age: 82
LOS: 4 days | DRG: 177 | End: 2017-09-05
Attending: EMERGENCY MEDICINE | Admitting: FAMILY MEDICINE
Payer: MEDICARE

## 2017-09-01 ENCOUNTER — APPOINTMENT (EMERGENCY)
Dept: RADIOLOGY | Facility: HOSPITAL | Age: 82
DRG: 177 | End: 2017-09-01
Payer: MEDICARE

## 2017-09-01 DIAGNOSIS — N39.0 ACUTE URINARY TRACT INFECTION: Primary | ICD-10-CM

## 2017-09-01 DIAGNOSIS — R41.82 ALTERED MENTAL STATUS: ICD-10-CM

## 2017-09-01 DIAGNOSIS — I48.91 RAPID ATRIAL FIBRILLATION (HCC): ICD-10-CM

## 2017-09-01 PROBLEM — L98.429 SACRAL ULCER (HCC): Status: ACTIVE | Noted: 2017-09-01

## 2017-09-01 PROBLEM — C14.0 THROAT CANCER (HCC): Status: ACTIVE | Noted: 2017-09-01

## 2017-09-01 PROBLEM — R79.89 ELEVATED BRAIN NATRIURETIC PEPTIDE (BNP) LEVEL: Status: ACTIVE | Noted: 2017-09-01

## 2017-09-01 PROBLEM — D64.9 NORMOCYTIC ANEMIA: Status: ACTIVE | Noted: 2017-09-01

## 2017-09-01 LAB
ALBUMIN SERPL BCP-MCNC: 3.1 G/DL (ref 3.5–5)
ALP SERPL-CCNC: 100 U/L (ref 46–116)
ALT SERPL W P-5'-P-CCNC: 18 U/L (ref 12–78)
AMMONIA PLAS-SCNC: 26 UMOL/L (ref 11–35)
ANION GAP SERPL CALCULATED.3IONS-SCNC: 1 MMOL/L (ref 4–13)
AST SERPL W P-5'-P-CCNC: 15 U/L (ref 5–45)
BACTERIA UR QL AUTO: ABNORMAL /HPF
BASOPHILS # BLD AUTO: 0.02 THOUSANDS/ΜL (ref 0–0.1)
BASOPHILS NFR BLD AUTO: 0 % (ref 0–1)
BILIRUB SERPL-MCNC: 0.4 MG/DL (ref 0.2–1)
BILIRUB UR QL STRIP: NEGATIVE
BUN SERPL-MCNC: 14 MG/DL (ref 5–25)
CALCIUM SERPL-MCNC: 9.2 MG/DL (ref 8.3–10.1)
CHLORIDE SERPL-SCNC: 100 MMOL/L (ref 100–108)
CLARITY UR: ABNORMAL
CO2 SERPL-SCNC: 39 MMOL/L (ref 21–32)
COLOR UR: YELLOW
CREAT SERPL-MCNC: 0.76 MG/DL (ref 0.6–1.3)
EOSINOPHIL # BLD AUTO: 0.1 THOUSAND/ΜL (ref 0–0.61)
EOSINOPHIL NFR BLD AUTO: 1 % (ref 0–6)
ERYTHROCYTE [DISTWIDTH] IN BLOOD BY AUTOMATED COUNT: 14 % (ref 11.6–15.1)
GFR SERPL CREATININE-BSD FRML MDRD: 69 ML/MIN/1.73SQ M
GLUCOSE SERPL-MCNC: 105 MG/DL (ref 65–140)
GLUCOSE UR STRIP-MCNC: NEGATIVE MG/DL
HCT VFR BLD AUTO: 36.4 % (ref 34.8–46.1)
HGB BLD-MCNC: 10.9 G/DL (ref 11.5–15.4)
HGB UR QL STRIP.AUTO: ABNORMAL
KETONES UR STRIP-MCNC: NEGATIVE MG/DL
LACTATE SERPL-SCNC: 0.6 MMOL/L (ref 0.5–2)
LEUKOCYTE ESTERASE UR QL STRIP: ABNORMAL
LYMPHOCYTES # BLD AUTO: 1.15 THOUSANDS/ΜL (ref 0.6–4.47)
LYMPHOCYTES NFR BLD AUTO: 13 % (ref 14–44)
MCH RBC QN AUTO: 29.2 PG (ref 26.8–34.3)
MCHC RBC AUTO-ENTMCNC: 29.9 G/DL (ref 31.4–37.4)
MCV RBC AUTO: 98 FL (ref 82–98)
MONOCYTES # BLD AUTO: 0.71 THOUSAND/ΜL (ref 0.17–1.22)
MONOCYTES NFR BLD AUTO: 8 % (ref 4–12)
NEUTROPHILS # BLD AUTO: 6.92 THOUSANDS/ΜL (ref 1.85–7.62)
NEUTS SEG NFR BLD AUTO: 78 % (ref 43–75)
NITRITE UR QL STRIP: POSITIVE
NON-SQ EPI CELLS URNS QL MICRO: ABNORMAL /HPF
NRBC BLD AUTO-RTO: 0 /100 WBCS
NT-PROBNP SERPL-MCNC: 675 PG/ML
PH UR STRIP.AUTO: 8 [PH] (ref 4.5–8)
PLATELET # BLD AUTO: 182 THOUSANDS/UL (ref 149–390)
PMV BLD AUTO: 10.4 FL (ref 8.9–12.7)
POTASSIUM SERPL-SCNC: 4.4 MMOL/L (ref 3.5–5.3)
PROT SERPL-MCNC: 6.6 G/DL (ref 6.4–8.2)
PROT UR STRIP-MCNC: >=300 MG/DL
RBC # BLD AUTO: 3.73 MILLION/UL (ref 3.81–5.12)
RBC #/AREA URNS AUTO: ABNORMAL /HPF
SODIUM SERPL-SCNC: 140 MMOL/L (ref 136–145)
SP GR UR STRIP.AUTO: 1.02 (ref 1–1.03)
T3FREE SERPL-MCNC: 1.51 PG/ML (ref 2.3–4.2)
T4 FREE SERPL-MCNC: 1.17 NG/DL (ref 0.76–1.46)
TROPONIN I SERPL-MCNC: <0.02 NG/ML
TSH SERPL DL<=0.05 MIU/L-ACNC: 9.04 UIU/ML (ref 0.36–3.74)
UROBILINOGEN UR QL STRIP.AUTO: 0.2 E.U./DL
WBC # BLD AUTO: 8.92 THOUSAND/UL (ref 4.31–10.16)
WBC #/AREA URNS AUTO: ABNORMAL /HPF

## 2017-09-01 PROCEDURE — 80053 COMPREHEN METABOLIC PANEL: CPT | Performed by: EMERGENCY MEDICINE

## 2017-09-01 PROCEDURE — 87186 SC STD MICRODIL/AGAR DIL: CPT | Performed by: EMERGENCY MEDICINE

## 2017-09-01 PROCEDURE — 84439 ASSAY OF FREE THYROXINE: CPT | Performed by: FAMILY MEDICINE

## 2017-09-01 PROCEDURE — 36415 COLL VENOUS BLD VENIPUNCTURE: CPT | Performed by: EMERGENCY MEDICINE

## 2017-09-01 PROCEDURE — 84443 ASSAY THYROID STIM HORMONE: CPT | Performed by: EMERGENCY MEDICINE

## 2017-09-01 PROCEDURE — 71020 HB CHEST X-RAY 2VW FRONTAL&LATL: CPT

## 2017-09-01 PROCEDURE — 84484 ASSAY OF TROPONIN QUANT: CPT | Performed by: EMERGENCY MEDICINE

## 2017-09-01 PROCEDURE — 87077 CULTURE AEROBIC IDENTIFY: CPT | Performed by: EMERGENCY MEDICINE

## 2017-09-01 PROCEDURE — 87086 URINE CULTURE/COLONY COUNT: CPT | Performed by: EMERGENCY MEDICINE

## 2017-09-01 PROCEDURE — 83605 ASSAY OF LACTIC ACID: CPT | Performed by: EMERGENCY MEDICINE

## 2017-09-01 PROCEDURE — 85025 COMPLETE CBC W/AUTO DIFF WBC: CPT | Performed by: EMERGENCY MEDICINE

## 2017-09-01 PROCEDURE — 96361 HYDRATE IV INFUSION ADD-ON: CPT

## 2017-09-01 PROCEDURE — 70450 CT HEAD/BRAIN W/O DYE: CPT

## 2017-09-01 PROCEDURE — 82140 ASSAY OF AMMONIA: CPT | Performed by: EMERGENCY MEDICINE

## 2017-09-01 PROCEDURE — 92610 EVALUATE SWALLOWING FUNCTION: CPT

## 2017-09-01 PROCEDURE — 93005 ELECTROCARDIOGRAM TRACING: CPT | Performed by: EMERGENCY MEDICINE

## 2017-09-01 PROCEDURE — 83880 ASSAY OF NATRIURETIC PEPTIDE: CPT | Performed by: EMERGENCY MEDICINE

## 2017-09-01 PROCEDURE — 84481 FREE ASSAY (FT-3): CPT | Performed by: FAMILY MEDICINE

## 2017-09-01 PROCEDURE — 81001 URINALYSIS AUTO W/SCOPE: CPT | Performed by: EMERGENCY MEDICINE

## 2017-09-01 PROCEDURE — 87040 BLOOD CULTURE FOR BACTERIA: CPT | Performed by: EMERGENCY MEDICINE

## 2017-09-01 PROCEDURE — 96365 THER/PROPH/DIAG IV INF INIT: CPT

## 2017-09-01 PROCEDURE — 94640 AIRWAY INHALATION TREATMENT: CPT

## 2017-09-01 PROCEDURE — 99285 EMERGENCY DEPT VISIT HI MDM: CPT

## 2017-09-01 PROCEDURE — 87147 CULTURE TYPE IMMUNOLOGIC: CPT | Performed by: EMERGENCY MEDICINE

## 2017-09-01 PROCEDURE — 94760 N-INVAS EAR/PLS OXIMETRY 1: CPT

## 2017-09-01 RX ORDER — HYDROCODONE BITARTRATE AND ACETAMINOPHEN 5; 325 MG/1; MG/1
1 TABLET ORAL EVERY 6 HOURS PRN
Status: DISCONTINUED | OUTPATIENT
Start: 2017-09-01 | End: 2017-09-02

## 2017-09-01 RX ORDER — DILTIAZEM HYDROCHLORIDE 180 MG/1
180 CAPSULE, COATED, EXTENDED RELEASE ORAL DAILY
Status: DISCONTINUED | OUTPATIENT
Start: 2017-09-01 | End: 2017-09-05 | Stop reason: HOSPADM

## 2017-09-01 RX ORDER — ONDANSETRON 2 MG/ML
4 INJECTION INTRAMUSCULAR; INTRAVENOUS EVERY 6 HOURS PRN
Status: DISCONTINUED | OUTPATIENT
Start: 2017-09-01 | End: 2017-09-05 | Stop reason: HOSPADM

## 2017-09-01 RX ORDER — DOCUSATE SODIUM 100 MG/1
100 CAPSULE, LIQUID FILLED ORAL 2 TIMES DAILY
Status: DISCONTINUED | OUTPATIENT
Start: 2017-09-01 | End: 2017-09-05 | Stop reason: HOSPADM

## 2017-09-01 RX ORDER — LEVOTHYROXINE SODIUM 0.12 MG/1
125 TABLET ORAL
Status: DISCONTINUED | OUTPATIENT
Start: 2017-09-01 | End: 2017-09-05 | Stop reason: HOSPADM

## 2017-09-01 RX ORDER — ALBUTEROL SULFATE 90 UG/1
2 AEROSOL, METERED RESPIRATORY (INHALATION) EVERY 4 HOURS PRN
Status: DISCONTINUED | OUTPATIENT
Start: 2017-09-01 | End: 2017-09-05

## 2017-09-01 RX ORDER — METOPROLOL TARTRATE 5 MG/5ML
5 INJECTION INTRAVENOUS ONCE
Status: DISCONTINUED | OUTPATIENT
Start: 2017-09-01 | End: 2017-09-01

## 2017-09-01 RX ORDER — ALBUTEROL SULFATE 0.63 MG/3ML
1 SOLUTION RESPIRATORY (INHALATION) EVERY 6 HOURS PRN
COMMUNITY
End: 2017-09-05 | Stop reason: HOSPADM

## 2017-09-01 RX ORDER — POLYETHYLENE GLYCOL 3350 17 G/17G
17 POWDER, FOR SOLUTION ORAL DAILY PRN
Status: DISCONTINUED | OUTPATIENT
Start: 2017-09-01 | End: 2017-09-02

## 2017-09-01 RX ORDER — ASPIRIN 81 MG/1
162 TABLET ORAL DAILY
Status: DISCONTINUED | OUTPATIENT
Start: 2017-09-01 | End: 2017-09-02

## 2017-09-01 RX ORDER — BISACODYL 10 MG
10 SUPPOSITORY, RECTAL RECTAL DAILY PRN
Status: DISCONTINUED | OUTPATIENT
Start: 2017-09-01 | End: 2017-09-05 | Stop reason: HOSPADM

## 2017-09-01 RX ORDER — SIMETHICONE 80 MG
80 TABLET,CHEWABLE ORAL EVERY 6 HOURS PRN
Status: DISCONTINUED | OUTPATIENT
Start: 2017-09-01 | End: 2017-09-02

## 2017-09-01 RX ORDER — NORTRIPTYLINE HYDROCHLORIDE 25 MG/1
25 CAPSULE ORAL
Status: DISCONTINUED | OUTPATIENT
Start: 2017-09-01 | End: 2017-09-05 | Stop reason: HOSPADM

## 2017-09-01 RX ORDER — NORTRIPTYLINE HYDROCHLORIDE 25 MG/1
50 CAPSULE ORAL
COMMUNITY

## 2017-09-01 RX ORDER — NYSTATIN 100000 [USP'U]/G
POWDER TOPICAL 2 TIMES DAILY
Status: DISCONTINUED | OUTPATIENT
Start: 2017-09-01 | End: 2017-09-05 | Stop reason: HOSPADM

## 2017-09-01 RX ORDER — ACETAMINOPHEN 325 MG/1
650 TABLET ORAL EVERY 4 HOURS PRN
COMMUNITY
End: 2017-09-05 | Stop reason: HOSPADM

## 2017-09-01 RX ORDER — AZITHROMYCIN 250 MG/1
500 TABLET, FILM COATED ORAL EVERY 24 HOURS
Status: DISCONTINUED | OUTPATIENT
Start: 2017-09-01 | End: 2017-09-02

## 2017-09-01 RX ORDER — LEVALBUTEROL 1.25 MG/.5ML
1.25 SOLUTION, CONCENTRATE RESPIRATORY (INHALATION)
Status: DISCONTINUED | OUTPATIENT
Start: 2017-09-01 | End: 2017-09-05

## 2017-09-01 RX ORDER — DILTIAZEM HYDROCHLORIDE 5 MG/ML
15 INJECTION INTRAVENOUS EVERY 4 HOURS PRN
Status: DISCONTINUED | OUTPATIENT
Start: 2017-09-01 | End: 2017-09-05 | Stop reason: HOSPADM

## 2017-09-01 RX ORDER — ACETAMINOPHEN 650 MG/1
650 SUPPOSITORY RECTAL EVERY 4 HOURS PRN
Status: ON HOLD | COMMUNITY
End: 2017-09-05

## 2017-09-01 RX ORDER — BENZONATATE 100 MG/1
100 CAPSULE ORAL 3 TIMES DAILY PRN
Status: DISCONTINUED | OUTPATIENT
Start: 2017-09-01 | End: 2017-09-05 | Stop reason: HOSPADM

## 2017-09-01 RX ADMIN — DOCUSATE SODIUM 100 MG: 100 CAPSULE, LIQUID FILLED ORAL at 12:08

## 2017-09-01 RX ADMIN — DILTIAZEM HYDROCHLORIDE 180 MG: 180 CAPSULE, COATED, EXTENDED RELEASE ORAL at 12:08

## 2017-09-01 RX ADMIN — METRONIDAZOLE 500 MG: 500 INJECTION, SOLUTION INTRAVENOUS at 21:46

## 2017-09-01 RX ADMIN — SODIUM CHLORIDE 500 ML: 0.9 INJECTION, SOLUTION INTRAVENOUS at 02:30

## 2017-09-01 RX ADMIN — IPRATROPIUM BROMIDE 0.5 MG: 0.5 SOLUTION RESPIRATORY (INHALATION) at 19:22

## 2017-09-01 RX ADMIN — ENOXAPARIN SODIUM 40 MG: 40 INJECTION SUBCUTANEOUS at 12:07

## 2017-09-01 RX ADMIN — AZITHROMYCIN 500 MG: 250 TABLET, FILM COATED ORAL at 06:46

## 2017-09-01 RX ADMIN — NYSTATIN: 100000 POWDER TOPICAL at 17:39

## 2017-09-01 RX ADMIN — DOCUSATE SODIUM 100 MG: 100 CAPSULE, LIQUID FILLED ORAL at 17:39

## 2017-09-01 RX ADMIN — METRONIDAZOLE 500 MG: 500 INJECTION, SOLUTION INTRAVENOUS at 14:21

## 2017-09-01 RX ADMIN — NYSTATIN: 100000 POWDER TOPICAL at 12:09

## 2017-09-01 RX ADMIN — DILTIAZEM HYDROCHLORIDE 15 MG: 5 INJECTION INTRAVENOUS at 04:44

## 2017-09-01 RX ADMIN — LEVALBUTEROL HYDROCHLORIDE 1.25 MG: 1.25 SOLUTION, CONCENTRATE RESPIRATORY (INHALATION) at 17:41

## 2017-09-01 RX ADMIN — NORTRIPTYLINE HYDROCHLORIDE 25 MG: 25 CAPSULE ORAL at 21:46

## 2017-09-01 RX ADMIN — CEFTRIAXONE 1000 MG: 1 INJECTION, POWDER, FOR SOLUTION INTRAMUSCULAR; INTRAVENOUS at 04:00

## 2017-09-01 RX ADMIN — IPRATROPIUM BROMIDE 0.5 MG: 0.5 SOLUTION RESPIRATORY (INHALATION) at 17:41

## 2017-09-01 RX ADMIN — LEVOTHYROXINE SODIUM 125 MCG: 125 TABLET ORAL at 06:46

## 2017-09-01 RX ADMIN — LEVALBUTEROL HYDROCHLORIDE 1.25 MG: 1.25 SOLUTION, CONCENTRATE RESPIRATORY (INHALATION) at 19:22

## 2017-09-01 RX ADMIN — BENZONATATE 100 MG: 100 CAPSULE ORAL at 14:47

## 2017-09-01 RX ADMIN — ASPIRIN 162 MG: 81 TABLET, COATED ORAL at 12:08

## 2017-09-01 NOTE — CASE MANAGEMENT
Initial Clinical Review    Admission: Date/Time/Statement: 9/1/17 @ 0426     Orders Placed This Encounter   Procedures    Inpatient Admission (expected length of stay for this patient is greater than two midnights)     Standing Status:   Standing     Number of Occurrences:   1     Order Specific Question:   Admitting Physician     Answer:   Ray Carballo [[id number]]     Order Specific Question:   Level of Care     Answer:   Med Surg [16]     Order Specific Question:   Estimated length of stay     Answer:   More than 2 Midnights     Order Specific Question:   Certification     Answer:   I certify that inpatient services are medically necessary for this patient for a duration of greater than two midnights  See H&P and MD Progress Notes for additional information about the patient's course of treatment  ED: Date/Time/Mode of Arrival:   ED Arrival Information     Expected Arrival Acuity Means of Arrival Escorted By Service Admission Type    - 9/1/2017 02:03 [facility] [address] Emergency    Arrival Complaint    unresponsive          Chief Complaint:   Chief Complaint   Patient presents with    Altered Mental Status     Patient brought in by EMS  Nursing home called EMS for dark urine, EMS found patient unresponsive  Patient began to wake up on arrival  Nursing home reports patient was altered today     Positive for congestion  Respiratory: Positive for cough and shortness of breath  Psychiatric/Behavioral: Positive for behavioral problems and confusion  History of Illness:   Kendra Rodriguez is a 80 y o  female who presents with PMHx of larynx ca, HTN, hypothyroidism, afib, presenting from [facility] for agitation, confusion, and lethargy  As per EMS notes patient was lethargic however patient states that she was faking it  Patient is paranoid that people want to hurt her  States that her main complaint is congestion and that she is coughing up yellow sputum   Denies any other systemic sx at this time  As per [employer] patients urine has become darker than normal  Patient is DNR as per paperwork from [facility]  ED Vital Signs:   ED Triage Vitals   Temperature Pulse Respirations Blood Pressure SpO2   09/01/17 0252 09/01/17 0210 09/01/17 0208 09/01/17 0208 09/01/17 0208   97 7 °F (36 5 °C) (!) 111 22 123/73 91 %      Temp Source Heart Rate Source Patient Position BP Location FiO2 (%)   09/01/17 0252 09/01/17 0210 09/01/17 0208 09/01/17 0208 --   Oral Monitor Lying Right arm       Pain Score       09/01/17 0232       No Pain        Wt Readings from Last 1 Encounters:   09/01/17 84 4 kg (186 lb 1 1 oz)       Vital Signs (abnormal):   09/01/17 0344  --   111  20  120/78  95 %  Nasal cannula  Lying   09/01/17 0252  97 7 °F (36 5 °C)  --  --  --  --  --  --   09/01/17 0232  --  --  --  --  --  Nasal cannula  --   09/01/17 0210  --   111  --  --  --  --  --   09/01/17 0208  --  --  22  123/73  91 %  Nasal cannula           Abnormal Labs/Diagnostic Test Results:   HEMOGLOBIN g/dL 10 9*   HEMATOCRIT % 36 4   PLATELETS Thousands/uL 182   NEUTROS PCT % 78*   LYMPHS PCT % 13*   MONOS PCT % 8   EOS PCT % 1         Results from last 7 days  Lab Units 09/01/17  0228   SODIUM mmol/L 140   POTASSIUM mmol/L 4 4   CHLORIDE mmol/L 100   CO2 mmol/L 39*   BUN mg/dL 14   CREATININE mg/dL 0 76     Xr Chest 1 View Portable     Result Date: 8/4/2017  Narrative: CHEST INDICATION:  Tachycardia  Arrhythmia COMPARISON:  2/20/2015 VIEWS:   AP frontal IMAGES:  1 FINDINGS:     There is cardiomegaly which is slightly more prominent than on the prior exam  There appears to be some pulmonary vascular congestion  No focal consolidations are seen  The previously described small nodular opacities in the right mid to upper lung zone are felt to be stable  No definitive evidence of enlarging mass  No pleural  fluid  No pneumothorax  Visualized osseous structures appear within normal limits for the patient's age     Impression: Increasing cardiomegaly  No suspicious pulmonary infiltrates Workstation performed: [id number]         EKG, Pathology, and Other Studies Reviewed on Admission:   · EKG: atrial flutter with variable AV block, RBBB, RVH  · UA, TSH, CBC, lactic, troponin, BNP, CMP  · CT head: no acute IC hemorrhage, age related cerebral volume loss chronic white matter ischemic changes  ED Treatment:   Medication Administration from 09/01/2017 0202 to 09/01/2017 9575       Date/Time Order Dose Route Action Action by Comments     09/01/2017 0344 sodium chloride 0 9 % bolus 500 mL 0 mL Intravenous Stopped Dangelo Hines RN      09/01/2017 0230 sodium chloride 0 9 % bolus 500 mL 500 mL Intravenous Yanntfadivdavidet 37 Britt Fernandez RN      09/01/2017 0400 ceftriaxone (ROCEPHIN) 1 g/50 mL in dextrose IVPB 1,000 mg Intravenous Adalgisaet 37 Dangelo Hines RN      09/01/2017 0441 metoprolol (LOPRESSOR) injection 5 mg 5 mg Intravenous Not Given Dangelo Hines RN Order changed by Adam Husbands  09/01/2017 0444 diltiazem (CARDIZEM) injection 15 mg 15 mg Intravenous Given Dangelo Hines RN           Past Medical/Surgical History:    Active Ambulatory Problems     Diagnosis Date Noted    Hypertension     Disease of thyroid gland     Cancer     SOB (shortness of breath) 08/04/2017    Atrial fibrillation with RVR 08/04/2017     Resolved Ambulatory Problems     Diagnosis Date Noted    No Resolved Ambulatory Problems     Past Medical History:   Diagnosis Date    Atrial fibrillation with RVR 8/4/2017    Cancer     Disease of thyroid gland     Dysphagia, oropharyngeal phase     Glaucoma     Hypertension     Larynx cancer        Admitting Diagnosis: Altered mental status [R41 82]  Acute urinary tract infection [N39 0]  Rapid atrial fibrillation [I48 91]    Age/Sex: 80 y o  female    Assessment/Plan: Hospital Problem List:      Principal Problem:    Altered mental status  Active Problems:    Disease of thyroid gland    SOB (shortness of breath)    Atrial fibrillation with RVR    Elevated brain natriuretic peptide (BNP) level    Normocytic anemia    Urinary tract infection    Throat cancer    Sacral ulcer        Plan for the Primary Problem(s):  · AMS  ? As per records from [facility] patient became confused, aggressive and then lethargic/unresponsive  ? CT head wo acute abnormality  ? Fall precautions, bedrest for now  ? Likely related to UTI/PNA-IV abx and monitor for improvement     Plan for Additional Problems:   Hypothyroidism                        TSH 9 045, home synthroid dose  SOB                        Awaiting formal CXR read however patient may have possible RLL infiltrate, incentive spirometry,            aspiration precautions, elevate HOB, O2 PRN, pulse ox with ambulation, sputum culture, strep               pneumoniae, legionella  Afib with RVR                        Cardizem PRN, patient is currently rate controlled at this time-no need for tele at this time                    consider cards consult, continue home ASA  Elevated BNP                        Pitting edema LE bilaterally, daily weights, I and Os, consider lasix  Normocytic anemia                        No evidence of bleeding, monitor CBC  UTI                        IV rocephin and azithro to cover UTI and possible HCAP, await urine and blood cultures  Throat ca                        Speech consult, swallow eval, NPO until that  Sacral ulcer                        Nursing wound care, turn patient     VTE Prophylaxis: Enoxaparin (Lovenox)  / sequential compression device   Code Status: DNR  POLST: POLST form is not discussed and not completed at this time      Anticipated Length of Stay:  Patient will be admitted on an Inpatient basis with an anticipated length of stay of  > 2 midnights     Justification for Hospital Stay: IV abx       Admission Orders:  [email or web address]  PT/OT  WOUND CARE  REG DIET  DAILY WEIGHTS  FALL PRECAUTIONS  SPEECH EVAL  O2  TELE  ASPIRATION PRECAUTIONS    Scheduled Meds:   aspirin 162 mg Oral Daily   [START ON 9/2/2017] cefTRIAXone 1,000 mg Intravenous Q24H   And      azithromycin 500 mg Oral Q24H   diltiazem 180 mg Oral Daily   docusate sodium 100 mg Oral BID   enoxaparin 40 mg Subcutaneous Daily   levothyroxine 125 mcg Oral Early Morning   nortriptyline 25 mg Oral HS   nystatin  Topical BID     Continuous Infusions:    PRN Meds:   albuterol    benzonatate    bisacodyl    Diltiazem X1    HYDROcodone-acetaminophen    ondansetron    polyethylene glycol    simethicone    PRIOR  Admission Date: 8/4/2017  Discharge Date: 08/08/17     Reason for Admission:  Atrial fibrillation     Discharge Diagnoses:      Principal Problem:    SOB (shortness of breath)  Active Problems:    Hypertension    Disease of thyroid gland    Cancer    Atrial fibrillation with RVR  Resolved Problems:    * No resolved hospital problems  *        Consultations During Hospital Stay:  Cardiology  Hospital Course:      Huey Coker is a 80 y o  female patient who originally presented to the hospital on 8/4/2017 due to atrial fibrillation, rapid ventricular response, with history of hypertension, laryngeal cancer, she was evaluated by a home health nurse found to have rapid and irregular heart rate, and was decided to be sent to the emergency room further evaluation, initially admitted to step-down for Cardizem drip, was not considered an anticoagulation candidate  Echocardiogram was obtained, which revealed EF 55% concentric hypertrophy, no prior history of peripheral vascular disease CAD/MI, the Cardizem drip was eventually switched to p o  Cardizem intermittent dosing, short-acting, which was increased to 60 mg initially every 6 hours and then I switched to every 8 hours yesterday anticipating discharge on a long-acting formulation of Cardizem    Anti:  Conversion would be 180 mg extended release of Cardizem which he will be discharged on, in addition of added 25 mg p  o  b i d  of metoprolol, I have spoken with the patient's daughter, she would benefit from an outpatient follow-up with Cardiology  Her past history of laryngeal cancer, the patient was on palliative care  Speech and Swallow evaluated the patient, and she seemed to be tolerating soft level 3 diet with thin liquids  The patient's TSH was acceptable and so were her blood sugars during the hospital stay            Discharge Day Visit / Exam:      Subjective:  Comfortably sitting in the chair distant heart rate has been much better control now other than episodes where she has slight agitation, the patient is doing well and tolerating both Cardizem and metoprolol  Vitals: Blood Pressure: 123/58 (08/08/17 0700)  Pulse: 77 (08/08/17 0700)  Temperature: 98 8 °F (37 1 °C) (08/08/17 0700)  Temp Source: Oral (08/08/17 0700)  Respirations: 18 (08/08/17 0700)  Height: 5' 4" (162 6 cm) (08/04/17 1710)  Weight - Scale: 83 6 kg (184 lb 4 9 oz) (08/08/17 0600)  SpO2: 98 % (08/08/17 0700)  Exam:   Physical Exam   Constitutional: No distress  HENT:   Head: Normocephalic  Eyes: Pupils are equal, round, and reactive to light  Neck: No tracheal deviation present  Cardiovascular: Normal rate  Pulmonary/Chest: No respiratory distress  She has no wheezes  Abdominal: She exhibits no distension  There is no tenderness  Musculoskeletal: She exhibits no edema  Neurological: She is alert     Skin: She is not diaphoretic          Discussion with Family:  Detailed discussion with the patient's daughter yesterday     Discharge instructions/Information to patient and family:   See after visit summary for information provided to patient and family        Provisions for Follow-Up Care:  See after visit summary for information related to follow-up care and any pertinent home health orders        Disposition:      Rehab     Planned Readmission: no     Discharge Statement:  I spent 34 minutes discharging the patient  This time was spent on the day of discharge  I had direct contact with the patient on the day of discharge  Greater than 50% of the total time was spent examining patient, answering all patient questions, arranging and discussing plan of care with patient as well as directly providing post-discharge instructions    Additional time then spent on discharge activities      Discharge Medications:  See after visit summary for reconciled discharge medications provided to patient and family
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
## 2017-09-01 NOTE — PLAN OF CARE
Problem: SLP ADULT - SWALLOWING, IMPAIRED  Goal: Initial SLP swallow eval performed  Outcome: Completed Date Met: 09/01/17

## 2017-09-01 NOTE — SPEECH THERAPY NOTE
Speech-Language Pathology Bedside Swallow Evaluation        Patient Name: Shahzad Johansen    [id number] Date: 9/1/2017     Problem List  Patient Active Problem List   Diagnosis    Hypertension    Disease of thyroid gland    Cancer    SOB (shortness of breath)    Atrial fibrillation with RVR    Altered mental status    Elevated brain natriuretic peptide (BNP) level    Normocytic anemia    Urinary tract infection    Throat cancer    Sacral ulcer       Past Medical History  Past Medical History:   Diagnosis Date    Atrial fibrillation with RVR 8/4/2017    Cancer     throat     Disease of thyroid gland     Dysphagia, oropharyngeal phase     Glaucoma     Hypertension     Larynx cancer        Past Surgical History  Past Surgical History:   Procedure Laterality Date    APPENDECTOMY      TONSILLECTOMY           Current Medical Status  Pt is a 80 y o  female who presented to [facility] with AMS  Past medical history:  A fib with RVR, laryngeal CA, dysphagia, glaucoma, HTN  Please see H&P for details      Special Studies:  CTH;  "IMPRESSION:     No acute intracranial abnormality    Microangiopathic changes "    CXR;  "IMPRESSION:     Evolving right basilar infiltrate with small effusion      Stable cardiomegaly and left perihilar scarring      Findings concur with the preliminary report by the referring clinician already in PACS and/or our electronic record EPIC "    Social/Education/Vocational Hx:  Pt lives in assisted living facility      Swallow Information   Current Risks for Dysphagia & Aspiration: known history of dysphagia     Current Symptoms/Concerns: concern for aspiration, known history of dysphagia    Current Diet: NPO      Baseline Diet: soft/level 3 diet and thin liquids      Baseline Assessment   Behavior/Cognition: alert    Speech/Language Status: able to participate in conversation    Patient Positioning: upright in bed       Swallow Mechanism Exam   Facial: symmetrical  Labial: WFL  Lingual: WFL  Velum: symmetrical  Mandible: adequate ROM  Dentition: full dentures  Vocal quality:weak and rough   Volitional Cough: weak   Tracheostomy: n/a  Respiratory status: nasal cannula     Consistencies Assessed and Performance   Consistencies Administered: thin liquids, nectar thick, puree and soft solids    Oral Stage: mild, decreased bolus propulsion, decreased mastication and decreased bolus formation    Pharyngeal Stage: mild, suspected pharyngeal swallow delay and suspected decreased hyolaryngeal elevation upon palpation    Swallow Mechanics: delayed     Esophageal Concerns: none reported    Strategies and Efficacy: none trialed      Summary   Swallow Summary: Pt presents with mild oral and suspected mild pharyngeal dysphagia characterized by decreased bolus propulsion, decreased mastication and decreased bolus formation suspected pharyngeal swallow delay and suspected decreased hyolaryngeal elevation upon palpation  Coughing was observed with thin liquids  Given concern for aspiration, and pt's known history of dysphagia and laryngeal CA, she would likely benefit from diet modification  Pt was agreeable to change to nectar thick liquids     Risk for Aspiration: moderate     Recommendations: soft/level 3 diet and nectar thick liquids     Recommended Form of Meds: crushed with puree     Aspiration precautions and compensatory swallowing strategies: upright posture, only feed when fully alert and slow rate of feeding    Results Reviewed with: patient, RN, MD and family     Treatment Recommendations: will monitor for diet tolerance    Dysphagia Goals: Pt will tolerate least restrictive diet with no s/s of aspiration     Discharge recommendation: return to [facility]      Speech Therapy Prognosis   Prognosis: fair    Prognosis Considerations: age and prior medical history      Recommended Consults  none      Plan  Recommend soft/level 3 diet with nectar thick liquids  Will continue to follow         Maurice Nation Dash SPRINGER  [address] Pathologist  Phone [phone]  Pager [phone]

## 2017-09-01 NOTE — PHYSICAL THERAPY NOTE
Physical Therapy Cancellation Note    PT order received  Chart review performed  At this time, PT evaluation cancelled as pt w/ strict bedrest order, please provide updated activity order when appropriate for PT mobility assessment to be performed  PT will follow and evaluate as appropriate      Helene Baron, PT, DPT

## 2017-09-01 NOTE — SOCIAL WORK
CM called BELINDA Price and spoke to nurse [facility]  She informs CM that this pt  has "behaviors"  When CARLOS questioned what was meant by "behaviors", she said that pt has been accusatory to the staff and family and the accusations were found to be false  She has requested to be seen by hospice because she wanted to die  She has written letters  threatening to murder people and staff  Her PCP-Dr Priya Corea is aware and feels pt has been like this her whole life  He started her on Pamelar and she has been seen by psychiatry at the facility

## 2017-09-01 NOTE — ED PROVIDER NOTES
History  Chief Complaint   Patient presents with    Altered Mental Status     Patient brought in by EMS  Nursing home called EMS for dark urine, EMS found patient unresponsive  Patient began to wake up on arrival  Nursing home reports patient was altered today     28-year-old female with a history of hypertension hypothyroid atrial fibrillation on aspirin presenting from nursing home with altered mental status  The patient has been reportedly increasingly agitated throughout the day today and was brought for evaluation  The patient has reportedly been more confused and agitated than baseline  The EMS reported that upon arriving she was unresponsive to painful stimuli, upon arriving to the emergency department the patient states that she did this on purpose she she thought they were out to get her and hurt her, she states that she was "faking it" recalls the entire episode  Bedside the patient does appear mildly paranoid and concerned about delusions multiple different delusions, she is oriented times self only currently, she does appear confused, she denies a complete review of systems and has no complaints although this is may be unreliable secondary to her change in mental status, additional history unavailable at this time  Prior to Admission Medications   Prescriptions Last Dose Informant Patient Reported? Taking?    albuterol (ACCUNEB) 0 63 MG/3ML nebulizer solution 9/1/2017 at Unknown time  Yes Yes   Sig: Take 1 ampule by nebulization every 6 (six) hours as needed for wheezing   aspirin (ECOTRIN LOW STRENGTH) 81 mg EC tablet 9/1/2017 at Unknown time  No Yes   Sig: Take 2 tablets by mouth daily   benzonatate (TESSALON PERLES) 100 mg capsule   Yes No   Sig: Take 100 mg by mouth 3 (three) times a day as needed for cough   bisacodyl (FLEET) 10 MG/30ML ENEM   Yes Yes   Sig: Insert 10 mg into the rectum once   brimonidine-timolol (COMBIGAN) 0 2-0 5 % 9/1/2017 at Unknown time  Yes Yes   Sig: Administer 1 drop to both eyes every 12 (twelve) hours  diltiazem (CARDIZEM CD) 180 mg 24 hr capsule 9/1/2017 at Unknown time  No Yes   Sig: Take 1 capsule by mouth daily   docusate sodium (COLACE) 100 mg capsule 9/1/2017 at Unknown time  No Yes   Sig: Take 1 capsule by mouth 2 (two) times a day   hYDROcodone-acetaminophen (LORTAB) 7 5-500 MG/15ML solution 9/1/2017 at Unknown time  Yes Yes   Sig: Take 5 mL by mouth every 6 (six) hours as needed for moderate pain   levothyroxine 125 mcg tablet 9/1/2017 at Unknown time  Yes Yes   Sig: Take 125 mcg by mouth daily  metoprolol tartrate (LOPRESSOR) 25 mg tablet 9/1/2017 at Unknown time  No Yes   Sig: Take 1 tablet by mouth every 12 (twelve) hours   nortriptyline (PAMELOR) 25 mg capsule 9/1/2017 at Unknown time  Yes Yes   Sig: Take 25 mg by mouth daily at bedtime   nystatin (MYCOSTATIN) powder   No No   Sig: Apply topically 2 (two) times a day   polyethylene glycol (MIRALAX) 17 g packet 9/1/2017 at Unknown time  No Yes   Sig: Take 17 g by mouth daily as needed (Constipation)   simethicone (MYLICON) 80 mg chewable tablet   No No   Sig: Chew 1 tablet every 6 (six) hours as needed for flatulence      Facility-Administered Medications: None       Past Medical History:   Diagnosis Date    Atrial fibrillation with RVR 8/4/2017    Cancer     throat     Disease of thyroid gland     Dysphagia, oropharyngeal phase     Glaucoma     Hypertension     Larynx cancer        Past Surgical History:   Procedure Laterality Date    APPENDECTOMY      TONSILLECTOMY         History reviewed  No pertinent family history  I have reviewed and agree with the history as documented  Social History   Substance Use Topics    Smoking status: Former Smoker    Smokeless tobacco: Not on file    Alcohol use No        Review of Systems   Unable to perform ROS: Mental status change   Constitutional: Negative for fever  Respiratory: Negative for cough and shortness of breath  Gastrointestinal: Negative for abdominal pain, nausea and vomiting  Genitourinary: Negative for difficulty urinating and dysuria  Musculoskeletal: Negative for neck pain and neck stiffness  Skin: Negative for rash and wound  Neurological: Negative for weakness and headaches  Hematological: Negative for adenopathy  Does not bruise/bleed easily  All other systems reviewed and are negative  Physical Exam  ED Triage Vitals   Temperature Pulse Respirations Blood Pressure SpO2   09/01/17 0252 09/01/17 0210 09/01/17 0208 09/01/17 0208 09/01/17 0208   97 7 °F (36 5 °C) (!) 111 22 123/73 91 %      Temp Source Heart Rate Source Patient Position BP Location FiO2 (%)   09/01/17 0252 09/01/17 0210 09/01/17 0208 09/01/17 0208 --   Oral Monitor Lying Right arm       Pain Score       09/01/17 0232       No Pain           Physical Exam   Constitutional: She appears well-developed and well-nourished  No distress  Patient is sitting upright she appears confused but in no acute distress   HENT:   Head: Normocephalic and atraumatic  Right Ear: External ear normal    Left Ear: External ear normal    Eyes: EOM are normal  Pupils are equal, round, and reactive to light  Neck: Normal range of motion  Neck supple  No tracheal deviation present  Cardiovascular: Normal rate, regular rhythm and normal heart sounds  Exam reveals no gallop and no friction rub  No murmur heard  Pulmonary/Chest: Effort normal and breath sounds normal  She has no wheezes  She has no rales  Abdominal: Soft  Bowel sounds are normal  She exhibits no distension  There is no tenderness  There is no rebound and no guarding  Musculoskeletal: Normal range of motion  She exhibits edema  She exhibits no tenderness  Patient has 1+ symmetric edema skin and musculoskeletal exam otherwise unremarkable for infectious findings or injuries   Neurological: She is alert  No cranial nerve deficit  She exhibits normal muscle tone   Coordination normal    Patient is oriented times self only cranial nerves are grossly intact she is moving all extremities equally her head appears atraumatic there is no nuchal rigidity or meningismus   Skin: Skin is warm and dry  No rash noted  Two small pressure ulcers on her backside none of which appear secondarily infected   Psychiatric: She has a normal mood and affect  Her behavior is normal    Nursing note and vitals reviewed        ED Medications  Medications   ceftriaxone (ROCEPHIN) 1 g/50 mL in dextrose IVPB (1,000 mg Intravenous New Bag 9/1/17 0400)   metoprolol (LOPRESSOR) injection 5 mg (5 mg Intravenous Not Given 9/1/17 0441)   diltiazem (CARDIZEM) injection 15 mg (15 mg Intravenous Given 9/1/17 0444)   sodium chloride 0 9 % bolus 500 mL (0 mL Intravenous Stopped 9/1/17 0344)       Diagnostic Studies  Labs Reviewed   CBC AND DIFFERENTIAL - Abnormal        Result Value Ref Range Status    RBC 3 73 (*) 3 81 - 5 12 Million/uL Final    Hemoglobin 10 9 (*) 11 5 - 15 4 g/dL Final    MCHC 29 9 (*) 31 4 - 37 4 g/dL Final    Neutrophils Relative 78 (*) 43 - 75 % Final    Lymphocytes Relative 13 (*) 14 - 44 % Final    WBC 8 92  4 31 - 10 16 Thousand/uL Final    Hematocrit 36 4  34 8 - 46 1 % Final    MCV 98  82 - 98 fL Final    MCH 29 2  26 8 - 34 3 pg Final    RDW 14 0  11 6 - 15 1 % Final    MPV 10 4  8 9 - 12 7 fL Final    Platelets 841  591 - 390 Thousands/uL Final    nRBC 0  /100 WBCs Final    Monocytes Relative 8  4 - 12 % Final    Eosinophils Relative 1  0 - 6 % Final    Basophils Relative 0  0 - 1 % Final    Neutrophils Absolute 6 92  1 85 - 7 62 Thousands/µL Final    Lymphocytes Absolute 1 15  0 60 - 4 47 Thousands/µL Final    Monocytes Absolute 0 71  0 17 - 1 22 Thousand/µL Final    Eosinophils Absolute 0 10  0 00 - 0 61 Thousand/µL Final    Basophils Absolute 0 02  0 00 - 0 10 Thousands/µL Final   COMPREHENSIVE METABOLIC PANEL - Abnormal     CO2 39 (*) 21 - 32 mmol/L Final    Anion Gap 1 (*) 4 - 13 mmol/L Final Albumin 3 1 (*) 3 5 - 5 0 g/dL Final    Sodium 140  136 - 145 mmol/L Final    Potassium 4 4  3 5 - 5 3 mmol/L Final    Chloride 100  100 - 108 mmol/L Final    BUN 14  5 - 25 mg/dL Final    Creatinine 0 76  0 60 - 1 30 mg/dL Final    Comment: Standardized to IDMS reference method    Glucose 105  65 - 140 mg/dL Final    Comment:   If the patient is fasting, the ADA then defines impaired fasting glucose as > 100 mg/dL and diabetes as > or equal to 123 mg/dL  Specimen collection should occur prior to Sulfasalazine administration due to the potential for falsely depressed results  Specimen collection should occur prior to Sulfapyridine administration due to the potential for falsely elevated results  Calcium 9 2  8 3 - 10 1 mg/dL Final    AST 15  5 - 45 U/L Final    Comment:   Specimen collection should occur prior to Sulfasalazine administration due to the potential for falsely depressed results  ALT 18  12 - 78 U/L Final    Comment:   Specimen collection should occur prior to Sulfasalazine administration due to the potential for falsely depressed results  Alkaline Phosphatase 100  46 - 116 U/L Final    Total Protein 6 6  6 4 - 8 2 g/dL Final    Total Bilirubin 0 40  0 20 - 1 00 mg/dL Final    eGFR 69  ml/min/1 73sq m Final    Narrative:     National Kidney Disease Education Program recommendations are as follows:  GFR calculation is accurate only with a steady state creatinine  Chronic Kidney disease less than 60 ml/min/1 73 sq  meters  Kidney failure less than 15 ml/min/1 73 sq  meters  TSH, 3RD GENERATION - Abnormal     TSH 3RD GENERATON 9 045 (*) 0 358 - 3 740 uIU/mL Final    Narrative:     Patients undergoing fluorescein dye angiography may retain small amounts of fluorescein in the body for 48-72 hours post procedure  Samples containing fluorescein can produce falsely depressed TSH values  If the patient had this procedure,a specimen should be resubmitted post fluorescein clearance            The recommended reference ranges for TSH during pregnancy are as follows:  First trimester 0 1 to 2 5 uIU/mL  Second trimester  0 2 to 3 0 uIU/mL  Third trimester 0 3 to 3 0 uIU/m     UA W REFLEX TO MICROSCOPIC WITH REFLEX TO CULTURE - Abnormal     Leukocytes, UA Large (*) Negative Final    Nitrite, UA Positive (*) Negative Final    Protein, UA >=300 (*) Negative mg/dl Final    Blood, UA Small (*) Negative Final    Color, UA Yellow   Final    Clarity, UA Slightly Cloudy   Final    Specific Gravity, UA 1 020  1 003 - 1 030 Final    pH, UA 8 0  4 5 - 8 0 Final    Glucose, UA Negative  Negative mg/dl Final    Ketones, UA Negative  Negative mg/dl Final    Urobilinogen, UA 0 2  0 2, 1 0 E U /dl E U /dl Final    Bilirubin, UA Negative  Negative Final   NT-BNP PRO (BRAIN NATRIURETIC PEPTIDE) - Abnormal     NT-proBNP 675 (*) <450 pg/mL Final   URINE MICROSCOPIC - Abnormal     RBC, UA 1-2 (*) None Seen /hpf Final    WBC, UA Innumerable (*) None Seen /hpf Final    Bacteria, UA Moderate (*) None Seen, Occasional /hpf Final    Epithelial Cells None Seen  None Seen, Occasional /hpf Final   LACTIC ACID, PLASMA - Normal    LACTIC ACID 0 6  0 5 - 2 0 mmol/L Final    Narrative:     Result may be elevated if tourniquet was used during collection  TROPONIN I - Normal    Troponin I <0 02  <=0 04 ng/mL Final    Comment: 3Autovalidation override    Narrative:     [employer] Chemistry analyzer 99% cutoff is > 0 04 ng/mL in network labs    o cTnI 99% cutoff is useful only when applied to patients in the clinical setting of myocardial ischemia  o cTnI 99% cutoff should be interpreted in the context of clinical history, ECG findings and possibly cardiac imaging to establish correct diagnosis  o cTnI 99% cutoff may be suggestive but clearly not indicative of a coronary event without the clinical setting of myocardial ischemia     AMMONIA - Normal    Ammonia 26  11 - 35 umol/L Final    Comment: 13  Specimen collection should occur prior to Sulfapyridine administration due to the potential for falsely depressed results  URINE CULTURE   BLOOD CULTURE   BLOOD CULTURE       XR chest 2 views   ED Interpretation   Possible right lower lobe infiltrate      CT head without contrast   ED Interpretation   Vrad- no acute abnormality          Procedures  ECG 12 Lead Documentation  Date/Time: 9/1/2017 2:24 AM  Performed by: José Antonio Matos by: Fernando Lopez     Indications / Diagnosis:  Tachycardia  Patient location:  ED  Previous ECG:     Previous ECG:  Compared to current  Interpretation:     Interpretation: non-specific    Rate:     ECG rate:  113    ECG rate assessment: tachycardic    Rhythm:     Rhythm: atrial fibrillation    Ectopy:     Ectopy: none    QRS:     QRS axis:  Right  ST segments:     ST segments:  Non-specific  T waves:     T waves: non-specific            Phone Contacts  ED Phone Contact    ED Course  ED Course   Waldo Wilson's Documentation   Comment Time   Patient has 2 pressure ulcers on her backside present on admission   09/01 0343   Possible right lower lobe infiltrate versus poor inspiration, not appreciated on the lateral, patient is receiving Rocephin for acute urinary tract infection she is afebrile she does not have a white count although she is acutely altered from her baseline does not appear safe to go back to the nursing home, blood cultures ordered, Rocephin given, discussed with Medicine, will place inpatient admission, heart rate improved 09/01 0426                         Initial Sepsis Screening       09/01/17 0320                Is the patient's history suggestive of a new or worsening infection? (!)  Yes (Proceed)  -SZ        Suspected source of infection urinary tract infection  -SZ        Are two or more of the following signs & symptoms of infection both present and new to the patient? Indicate SIRS criteria Altered mental status; Tachycardia > 90 bpm  -SZ        If the answer is yes to both questions, suspicion of sepsis is present         If severe sepsis is present AND tissue hypoperfusion perists in the hour after fluid resuscitation or lactate > 4, the patient meets criteria for SEPTIC SHOCK         Are any of the following organ dysfunction criteria present within 6 hours of suspected infection and SIRS criteria that are NOT considered to be chronic conditions? No  -SZ        Organ dysfunction         Date of presentation of severe sepsis         Time of presentation of severe sepsis         Tissue hypoperfusion persists in the hour after crystalloid fluid administration, evidenced, by either:         Was hypotension present within one hour of the conclusion of crystalloid fluid administration?          Date of presentation of septic shock         Time of presentation of septic shock           User Key  (r) = Recorded By, (t) = Taken By, (c) = Cosigned By    [address] Name Provider Type    KAMALA Hernandez, DO Physician                  MDM  Number of Diagnoses or Management Options  Acute urinary tract infection:   Altered mental status:   Rapid atrial fibrillation:   Diagnosis management comments: 24-year-old female with a history of atrial fibrillation on aspirin with 1 day of progressive change in mental status history of recurrent urinary tract infections, additional history is unavailable as patient is acutely confused she is nontoxic appearing in no acute distress she is afebrile with mildly elevated heart rate with history of rapid atrial fibrillation, otherwise exam is nonfocal, will do broad medical workup with urinalysis, straight cath, CT of the head, likely admit, disposition pending further evaluation    CritCare Time    Disposition  Final diagnoses:   Acute urinary tract infection   Altered mental status   Rapid atrial fibrillation     ED Disposition     ED Disposition Condition Comment    Admit  Case was discussed with Lauren Molina and the patient's admission status was agreed to be [id number] Status: inpatient status to the service of Dr Gigi Julio   Follow-up Information    None       Patient's Medications   Discharge Prescriptions    No medications on file     No discharge procedures on file      ED Provider  Electronically Signed by       Ledy Herring DO  09/01/17 1600

## 2017-09-01 NOTE — WOUND OSTOMY CARE
Progress Note - Wound   Viral Mayes 80 y o  female MRN: [id number]  Unit/Bed#: -01 Encounter: [id number]      Assessment:  Patient is 80year old female who presents with agitation, confusion and lethargy from her nursing home  Patient admitted with altered mental status  Patient has a history of a-fib, laryngeal cancer, HTN, glaucoma, and dysphagia  Wound care to see patient at nursing request for nursing documentation of pressure injuries noted on admission  Patient is incontinent of bowel and bladder  Assist of one with turning  Primary nurse at bedside during assessment  B/l heels are intact with blanchable redness, recommend offloading and hydraguard  Sacrum is intact with blanchable redness  L buttock with stage 2 pressure injury  This wound was noted on admission  Wound bed is 100% dry and beefy red  Edges and madelaine-wound are dry, thick, scarring and peeling skin  Madelaine-wound / L buttock with noted blanchable hyperpigmentation  R buttock with  stage 2 pressure injury  This wound was noted on admission  Wound bed is 100% dry and beefy red  Edges and madelaine-wound are dry, thick, scarring and peeling skin  Madeliane-wound / R buttock with noted blanchable hyperpigmentation  Recommend hydraguard to the buttocks and sacrum to moisturize these dry wound beds and for protection due to incontinence  See flow sheets for more detailed assessment findings  Primary nurse aware of plan of care  Wound care will follow along with patient  Plan:   1  Apply hydraguard to b/l buttocks, heels and sacrum TID and PRN for prevention and protection  2  Apply skin nourishing cream to the entire skin daily for moisture  3  Elevate heels off of bed with pillows to offload   4  Turn and reposition patient every 2 hours  5  Soft care cushion to chair when OOB   6   Wound care will follow along with patient weekly, please call with questions or concerns [phone]      Objective:    Vitals: Blood pressure 143/85, pulse 105, temperature 97 6 °F (36 4 °C), temperature source Oral, resp  rate 20, height 5' 4" (1 626 m), weight 84 4 kg (186 lb 1 1 oz), SpO2 100 %  ,Body mass index is 31 94 kg/m²  Pressure Ulcer 09/01/17 Buttocks Right stage 2  (Active)   Staging Stage II 9/1/2017 11:00 AM   Wound Description Dry;Beefy red 9/1/2017 11:00 AM   Parul-wound Assessment Dry; Intact;Fragile 9/1/2017 11:00 AM   Wound Length (cm) 0 6 cm 9/1/2017 11:00 AM   Wound Width (cm) 0 5 cm 9/1/2017 11:00 AM   Wound Depth (cm) 0 1 9/1/2017 11:00 AM   Calculated Wound Area (cm^2) 0 3 cm^2 9/1/2017 11:00 AM   Calculated Wound Volume (cm^3) 0 03 cm^3 9/1/2017 11:00 AM   Drainage Amount None 9/1/2017 11:00 AM   Treatment Cleansed;Elevated with pillow(s); Offload;Softcare cushion;Turn & reposition 9/1/2017 11:00 AM   Dressing Moisture barrier 9/1/2017 11:00 AM   Wound packed? No 9/1/2017 11:00 AM   Patient Tolerance Tolerated well 9/1/2017 11:00 AM       Pressure Ulcer 09/01/17 Ankle Left stage 2  (Active)   Staging Stage II 9/1/2017 11:00 AM   Wound Description Dry;Beefy red 9/1/2017 11:00 AM   Parul-wound Assessment Dry; Intact;Fragile 9/1/2017 11:00 AM   Wound Length (cm) 0 9 cm 9/1/2017 11:00 AM   Wound Width (cm) 0 5 cm 9/1/2017 11:00 AM   Wound Depth (cm) 0 1 9/1/2017 11:00 AM   Calculated Wound Area (cm^2) 0 45 cm^2 9/1/2017 11:00 AM   Calculated Wound Volume (cm^3) 0 04 cm^3 9/1/2017 11:00 AM   Drainage Amount None 9/1/2017 11:00 AM   Treatment Cleansed;Elevated with pillow(s); Offload;Softcare cushion;Turn & reposition 9/1/2017 11:00 AM   Dressing Moisture barrier 9/1/2017 11:00 AM   Wound packed? No 9/1/2017 11:00 AM   Patient Tolerance Tolerated well 9/1/2017 11:00 AM         Recommendations written as orders    Yanelis Andre RN BSN

## 2017-09-01 NOTE — ED NOTES
Patient changed, diapered, log rolled, barrier cream applied, patient repositioned in bed for comfort  Gown changed        Casie Harmon RN  09/01/17 0123
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
## 2017-09-01 NOTE — H&P
History and Physical - [facility] Internal Medicine    Patient Information: Russ Bumpers 80 y o  female MRN: [id number]  Unit/Bed#: ED 29 Encounter: [id number]  Admitting Physician: Benjamín Walls PA-C  PCP: Jacinto Andres DO  Date of Admission:  09/01/17    Assessment/Plan:    Hospital Problem List:     Principal Problem:    Altered mental status  Active Problems:    Disease of thyroid gland    SOB (shortness of breath)    Atrial fibrillation with RVR    Elevated brain natriuretic peptide (BNP) level    Normocytic anemia    Urinary tract infection    Throat cancer    Sacral ulcer      Plan for the Primary Problem(s):  · AMS  · As per records from [facility] patient became confused, aggressive and then lethargic/unresponsive  · CT head wo acute abnormality  · Fall precautions, bedrest for now  · Likely related to UTI/PNA-IV abx and monitor for improvement    Plan for Additional Problems:   Hypothyroidism   TSH 9 045, home synthroid dose  SOB   Awaiting formal CXR read however patient may have possible RLL infiltrate, incentive spirometry,  aspiration precautions, elevate HOB, O2 PRN, pulse ox with ambulation, sputum culture, strep  pneumoniae, legionella  Afib with RVR   Cardizem PRN, patient is currently rate controlled at this time-no need for tele at this time  consider cards consult, continue home ASA  Elevated BNP   Pitting edema LE bilaterally, daily weights, I and Os, consider lasix  Normocytic anemia   No evidence of bleeding, monitor CBC  UTI   IV rocephin and azithro to cover UTI and possible HCAP, await urine and blood cultures  Throat ca   Speech consult, swallow eval, NPO until that  Sacral ulcer   Nursing wound care, turn patient    VTE Prophylaxis: Enoxaparin (Lovenox)  / sequential compression device   Code Status: DNR  POLST: POLST form is not discussed and not completed at this time      Anticipated Length of Stay:  Patient will be admitted on an Inpatient basis with an anticipated length of stay of  > 2 midnights  Justification for Hospital Stay: IV abx    Total Time for Visit, including Counseling / Coordination of Care: 45 minutes  Greater than 50% of this total time spent on direct patient counseling and coordination of care  Chief Complaint:   Agitated, confused, lethargic    History of Present Illness:    Almaz Jones is a 80 y o  female who presents with PMHx of larynx ca, HTN, hypothyroidism, afib, presenting from [facility] [place] for agitation, confusion, and lethargy  As per EMS notes patient was lethargic however patient states that she was faking it  Patient is paranoid that people want to hurt her  States that her main complaint is congestion and that she is coughing up yellow sputum  Denies any other systemic sx at this time  As per [facility] patients urine has become darker than normal  Patient is DNR as per paperwork from [facility]  Review of Systems:    Review of Systems   Constitutional:        May be limited 2/2 mental status change   HENT: Positive for congestion  Respiratory: Positive for cough and shortness of breath  Psychiatric/Behavioral: Positive for behavioral problems and confusion  Past Medical and Surgical History:     Past Medical History:   Diagnosis Date    Atrial fibrillation with RVR 8/4/2017    Cancer     throat     Disease of thyroid gland     Dysphagia, oropharyngeal phase     Glaucoma     Hypertension     Larynx cancer        Past Surgical History:   Procedure Laterality Date    APPENDECTOMY      TONSILLECTOMY         Meds/Allergies:    Prior to Admission medications    Medication Sig Start Date End Date Taking?  Authorizing Provider   albuterol (ACCUNEB) 0 63 MG/3ML nebulizer solution Take 1 ampule by nebulization every 6 (six) hours as needed for wheezing   Yes Historical Provider, MD   aspirin (ECOTRIN LOW STRENGTH) 81 mg EC tablet Take 2 tablets by mouth daily 8/8/17  Yes Natacha García MD   bisacodyl (FLEET) 10 MG/30ML ENEM Insert 10 mg into the rectum once   Yes Historical Provider, MD   brimonidine-timolol (COMBIGAN) 0 2-0 5 % Administer 1 drop to both eyes every 12 (twelve) hours  Yes Historical Provider, MD   diltiazem (CARDIZEM CD) 180 mg 24 hr capsule Take 1 capsule by mouth daily 8/8/17  Yes Nakia Ralph MD   docusate sodium (COLACE) 100 mg capsule Take 1 capsule by mouth 2 (two) times a day 8/8/17  Yes Nakia Ralph MD   hYDROcodone-acetaminophen (LORTAB) 7 5-500 MG/15ML solution Take 5 mL by mouth every 6 (six) hours as needed for moderate pain   Yes Historical Provider, MD   levothyroxine 125 mcg tablet Take 125 mcg by mouth daily  Yes Historical Provider, MD   metoprolol tartrate (LOPRESSOR) 25 mg tablet Take 1 tablet by mouth every 12 (twelve) hours 8/8/17  Yes Nakia Ralph MD   nortriptyline (PAMELOR) 25 mg capsule Take 25 mg by mouth daily at bedtime   Yes Historical Provider, MD   polyethylene glycol (MIRALAX) 17 g packet Take 17 g by mouth daily as needed (Constipation) 8/8/17  Yes Nakia Ralph MD   benzonatate (TESSALON PERLES) 100 mg capsule Take 100 mg by mouth 3 (three) times a day as needed for cough    Historical Provider, MD   nystatin (MYCOSTATIN) powder Apply topically 2 (two) times a day 8/8/17   Nakia Ralph MD   simethicone (MYLICON) 80 mg chewable tablet Chew 1 tablet every 6 (six) hours as needed for flatulence 8/8/17   MD margarito Aparicio paperwork/nurse med rec    Allergies: Allergies   Allergen Reactions    Dexamethasone Swelling     Pt c/o "moon face"       Social History:     Marital Status:    Occupation: retired  Patient Pre-hospital Living Situation: [facility]  Patient Pre-hospital Level of Mobility: assisted  Patient Pre-hospital Diet Restrictions: none  Substance Use History:   History   Alcohol Use No     History   Smoking Status    Former Smoker   Smokeless Tobacco    Not on file     History   Drug Use No       Family History:    History reviewed   No pertinent family history  Physical Exam:     Vitals:   Blood Pressure: 120/78 (09/01/17 0344)  Pulse: (!) 111 (09/01/17 0344)  Temperature: 97 7 °F (36 5 °C) (09/01/17 0252)  Temp Source: Oral (09/01/17 0252)  Respirations: 20 (09/01/17 0344)  Height: 5' 4" (162 6 cm) (09/01/17 0208)  Weight - Scale: 86 4 kg (190 lb 7 6 oz) (09/01/17 0208)  SpO2: 95 % (09/01/17 0344)    Physical Exam   Constitutional: She is oriented to person, place, and time  She appears well-developed and well-nourished  No distress  HENT:   Head: Normocephalic and atraumatic  Eyes: Conjunctivae are normal  Right eye exhibits no discharge  Left eye exhibits no discharge  No scleral icterus  Neck: Neck supple  Cardiovascular: Normal heart sounds and intact distal pulses  Exam reveals no gallop and no friction rub  No murmur heard  Tachycardic, irregularly irregular rhythm   Pulmonary/Chest: Effort normal  No respiratory distress  She has no wheezes  She has no rales  She exhibits no tenderness  Decreased inspiratory drive with anterior auscultation   Abdominal: Soft  Bowel sounds are normal  She exhibits no distension and no mass  There is no tenderness  There is no rebound and no guarding  Musculoskeletal: Normal range of motion  She exhibits edema (1+ pitting LE)  She exhibits no tenderness or deformity  Neurological: She is alert and oriented to person, place, and time  Alert and oriented to person, place, and time   Skin: Skin is warm and dry  No rash noted  She is not diaphoretic  No erythema  No pallor  Sacral ulcers   Psychiatric:   Paranoid behavior   Nursing note and vitals reviewed  Additional Data:     Lab Results: I have personally reviewed pertinent reports          Results from last 7 days  Lab Units 09/01/17  0228   WBC Thousand/uL 8 92   HEMOGLOBIN g/dL 10 9*   HEMATOCRIT % 36 4   PLATELETS Thousands/uL 182   NEUTROS PCT % 78*   LYMPHS PCT % 13*   MONOS PCT % 8   EOS PCT % 1       Results from last 7 days  Lab Units 09/01/17  0228   SODIUM mmol/L 140   POTASSIUM mmol/L 4 4   CHLORIDE mmol/L 100   CO2 mmol/L 39*   BUN mg/dL 14   CREATININE mg/dL 0 76   CALCIUM mg/dL 9 2   TOTAL PROTEIN g/dL 6 6   BILIRUBIN TOTAL mg/dL 0 40   ALK PHOS U/L 100   ALT U/L 18   AST U/L 15   GLUCOSE RANDOM mg/dL 105           Imaging: I have personally reviewed pertinent reports  Xr Chest 1 View Portable    Result Date: 8/4/2017  Narrative: CHEST INDICATION:  Tachycardia  Arrhythmia COMPARISON:  2/20/2015 VIEWS:   AP frontal IMAGES:  1 FINDINGS:     There is cardiomegaly which is slightly more prominent than on the prior exam  There appears to be some pulmonary vascular congestion  No focal consolidations are seen  The previously described small nodular opacities in the right mid to upper lung zone are felt to be stable  No definitive evidence of enlarging mass  No pleural  fluid  No pneumothorax  Visualized osseous structures appear within normal limits for the patient's age  Impression: Increasing cardiomegaly  No suspicious pulmonary infiltrates Workstation performed: [id number]       EKG, Pathology, and Other Studies Reviewed on Admission:   · EKG: atrial flutter with variable AV block, RBBB, RVH  · UA, TSH, CBC, lactic, troponin, BNP, CMP  · CT head: no acute IC hemorrhage, age related cerebral volume loss chronic white matter ischemic changes    Allscripts / Epic Records Reviewed: No     ** Please Note: This note has been constructed using a voice recognition system   **

## 2017-09-01 NOTE — SEPSIS NOTE
Sepsis Note   Ismael Miller 80 y o  female MRN: [id number]  Unit/Bed#: ED 29 Encounter: [id number]            Initial Sepsis Screening       09/01/17 0320                Is the patient's history suggestive of a new or worsening infection? (!)  Yes (Proceed)  -SZ        Suspected source of infection urinary tract infection  -SZ        Are two or more of the following signs & symptoms of infection both present and new to the patient? Indicate SIRS criteria Altered mental status; Tachycardia > 90 bpm  -SZ        If the answer is yes to both questions, suspicion of sepsis is present         If severe sepsis is present AND tissue hypoperfusion perists in the hour after fluid resuscitation or lactate > 4, the patient meets criteria for SEPTIC SHOCK         Are any of the following organ dysfunction criteria present within 6 hours of suspected infection and SIRS criteria that are NOT considered to be chronic conditions? No  -SZ        Organ dysfunction         Date of presentation of severe sepsis         Time of presentation of severe sepsis         Tissue hypoperfusion persists in the hour after crystalloid fluid administration, evidenced, by either:         Was hypotension present within one hour of the conclusion of crystalloid fluid administration?          Date of presentation of septic shock         Time of presentation of septic shock           User Key  (r) = Recorded By, (t) = Taken By, (c) = Cosigned By    [address] Name Provider Type    KAMALA Medina DO Physician

## 2017-09-01 NOTE — OCCUPATIONAL THERAPY NOTE
OT order received  Chart review performed  Pt has strict bedrest orders  OT cancelling at this time  Will follow to evaluate safe d/c home       Rochelle Babin OT

## 2017-09-02 ENCOUNTER — APPOINTMENT (INPATIENT)
Dept: RADIOLOGY | Facility: HOSPITAL | Age: 82
DRG: 177 | End: 2017-09-02
Payer: MEDICARE

## 2017-09-02 LAB
ALBUMIN SERPL BCP-MCNC: 2.7 G/DL (ref 3.5–5)
ALP SERPL-CCNC: 90 U/L (ref 46–116)
ALT SERPL W P-5'-P-CCNC: 14 U/L (ref 12–78)
ANION GAP SERPL CALCULATED.3IONS-SCNC: 4 MMOL/L (ref 4–13)
AST SERPL W P-5'-P-CCNC: 12 U/L (ref 5–45)
ATRIAL RATE: 256 BPM
BASE EXCESS BLDA CALC-SCNC: 9.8 MMOL/L
BILIRUB SERPL-MCNC: 0.3 MG/DL (ref 0.2–1)
BUN SERPL-MCNC: 12 MG/DL (ref 5–25)
CALCIUM SERPL-MCNC: 8.7 MG/DL (ref 8.3–10.1)
CHLORIDE SERPL-SCNC: 101 MMOL/L (ref 100–108)
CO2 SERPL-SCNC: 35 MMOL/L (ref 21–32)
CREAT SERPL-MCNC: 0.54 MG/DL (ref 0.6–1.3)
ERYTHROCYTE [DISTWIDTH] IN BLOOD BY AUTOMATED COUNT: 14.1 % (ref 11.6–15.1)
GFR SERPL CREATININE-BSD FRML MDRD: 83 ML/MIN/1.73SQ M
GLUCOSE SERPL-MCNC: 103 MG/DL (ref 65–140)
GLUCOSE SERPL-MCNC: 106 MG/DL (ref 65–140)
HCO3 BLDA-SCNC: 38.5 MMOL/L (ref 22–28)
HCT VFR BLD AUTO: 34.1 % (ref 34.8–46.1)
HGB BLD-MCNC: 10.1 G/DL (ref 11.5–15.4)
MCH RBC QN AUTO: 28.7 PG (ref 26.8–34.3)
MCHC RBC AUTO-ENTMCNC: 29.6 G/DL (ref 31.4–37.4)
MCV RBC AUTO: 97 FL (ref 82–98)
O2 CT BLDA-SCNC: 14.8 ML/DL (ref 16–23)
OXYHGB MFR BLDA: 97.5 % (ref 94–97)
P AXIS: 264 DEGREES
PCO2 BLDA: 79.3 MM HG (ref 36–44)
PH BLDA: 7.3 [PH] (ref 7.35–7.45)
PLATELET # BLD AUTO: 158 THOUSANDS/UL (ref 149–390)
PMV BLD AUTO: 10.5 FL (ref 8.9–12.7)
PO2 BLDA: 146.4 MM HG (ref 75–129)
POTASSIUM SERPL-SCNC: 4 MMOL/L (ref 3.5–5.3)
PROT SERPL-MCNC: 5.9 G/DL (ref 6.4–8.2)
QRS AXIS: 105 DEGREES
QRSD INTERVAL: 100 MS
QT INTERVAL: 344 MS
QTC INTERVAL: 471 MS
RBC # BLD AUTO: 3.52 MILLION/UL (ref 3.81–5.12)
SODIUM SERPL-SCNC: 140 MMOL/L (ref 136–145)
T WAVE AXIS: -13 DEGREES
VENTRICULAR RATE: 113 BPM
WBC # BLD AUTO: 5.86 THOUSAND/UL (ref 4.31–10.16)

## 2017-09-02 PROCEDURE — 94640 AIRWAY INHALATION TREATMENT: CPT

## 2017-09-02 PROCEDURE — 36600 WITHDRAWAL OF ARTERIAL BLOOD: CPT

## 2017-09-02 PROCEDURE — 94760 N-INVAS EAR/PLS OXIMETRY 1: CPT

## 2017-09-02 PROCEDURE — 93005 ELECTROCARDIOGRAM TRACING: CPT

## 2017-09-02 PROCEDURE — 80053 COMPREHEN METABOLIC PANEL: CPT | Performed by: FAMILY MEDICINE

## 2017-09-02 PROCEDURE — 82805 BLOOD GASES W/O2 SATURATION: CPT | Performed by: INTERNAL MEDICINE

## 2017-09-02 PROCEDURE — 71010 HB CHEST X-RAY 1 VIEW FRONTAL (PORTABLE): CPT

## 2017-09-02 PROCEDURE — 85027 COMPLETE CBC AUTOMATED: CPT | Performed by: FAMILY MEDICINE

## 2017-09-02 PROCEDURE — 82948 REAGENT STRIP/BLOOD GLUCOSE: CPT

## 2017-09-02 RX ORDER — ATROPINE SULFATE 10 MG/ML
2 SOLUTION/ DROPS OPHTHALMIC EVERY 4 HOURS PRN
Status: DISCONTINUED | OUTPATIENT
Start: 2017-09-02 | End: 2017-09-05 | Stop reason: HOSPADM

## 2017-09-02 RX ORDER — BRIMONIDINE TARTRATE, TIMOLOL MALEATE 2; 5 MG/ML; MG/ML
1 SOLUTION/ DROPS OPHTHALMIC EVERY 12 HOURS SCHEDULED
Status: DISCONTINUED | OUTPATIENT
Start: 2017-09-02 | End: 2017-09-05 | Stop reason: HOSPADM

## 2017-09-02 RX ORDER — METOPROLOL TARTRATE 5 MG/5ML
INJECTION INTRAVENOUS
Status: COMPLETED
Start: 2017-09-02 | End: 2017-09-02

## 2017-09-02 RX ORDER — MORPHINE SULFATE 100 MG/5ML
5 SOLUTION ORAL
Status: DISCONTINUED | OUTPATIENT
Start: 2017-09-02 | End: 2017-09-05 | Stop reason: HOSPADM

## 2017-09-02 RX ORDER — FUROSEMIDE 10 MG/ML
20 INJECTION INTRAMUSCULAR; INTRAVENOUS ONCE
Status: COMPLETED | OUTPATIENT
Start: 2017-09-02 | End: 2017-09-02

## 2017-09-02 RX ADMIN — NYSTATIN: 100000 POWDER TOPICAL at 08:11

## 2017-09-02 RX ADMIN — BRIMONIDINE TARTRATE, TIMOLOL MALEATE 1 DROP: 2; 5 SOLUTION/ DROPS OPHTHALMIC at 22:36

## 2017-09-02 RX ADMIN — NORTRIPTYLINE HYDROCHLORIDE 25 MG: 25 CAPSULE ORAL at 22:36

## 2017-09-02 RX ADMIN — DOCUSATE SODIUM 100 MG: 100 CAPSULE, LIQUID FILLED ORAL at 22:36

## 2017-09-02 RX ADMIN — LEVOTHYROXINE SODIUM 125 MCG: 125 TABLET ORAL at 06:23

## 2017-09-02 RX ADMIN — DOCUSATE SODIUM 100 MG: 100 CAPSULE, LIQUID FILLED ORAL at 08:10

## 2017-09-02 RX ADMIN — METRONIDAZOLE 500 MG: 500 INJECTION, SOLUTION INTRAVENOUS at 06:23

## 2017-09-02 RX ADMIN — FUROSEMIDE 20 MG: 10 INJECTION, SOLUTION INTRAMUSCULAR; INTRAVENOUS at 16:30

## 2017-09-02 RX ADMIN — METRONIDAZOLE 500 MG: 500 INJECTION, SOLUTION INTRAVENOUS at 13:53

## 2017-09-02 RX ADMIN — IPRATROPIUM BROMIDE 0.5 MG: 0.5 SOLUTION RESPIRATORY (INHALATION) at 19:28

## 2017-09-02 RX ADMIN — ONDANSETRON 4 MG: 2 INJECTION INTRAMUSCULAR; INTRAVENOUS at 16:40

## 2017-09-02 RX ADMIN — NYSTATIN: 100000 POWDER TOPICAL at 22:37

## 2017-09-02 RX ADMIN — LEVALBUTEROL HYDROCHLORIDE 1.25 MG: 1.25 SOLUTION, CONCENTRATE RESPIRATORY (INHALATION) at 07:29

## 2017-09-02 RX ADMIN — ONDANSETRON 4 MG: 2 INJECTION INTRAMUSCULAR; INTRAVENOUS at 02:11

## 2017-09-02 RX ADMIN — AZITHROMYCIN 500 MG: 250 TABLET, FILM COATED ORAL at 06:23

## 2017-09-02 RX ADMIN — ENOXAPARIN SODIUM 40 MG: 40 INJECTION SUBCUTANEOUS at 08:10

## 2017-09-02 RX ADMIN — LEVALBUTEROL HYDROCHLORIDE 1.25 MG: 1.25 SOLUTION, CONCENTRATE RESPIRATORY (INHALATION) at 13:50

## 2017-09-02 RX ADMIN — IPRATROPIUM BROMIDE 0.5 MG: 0.5 SOLUTION RESPIRATORY (INHALATION) at 13:50

## 2017-09-02 RX ADMIN — ASPIRIN 162 MG: 81 TABLET, COATED ORAL at 08:10

## 2017-09-02 RX ADMIN — METOPROLOL TARTRATE 5 MG: 5 INJECTION, SOLUTION INTRAVENOUS at 16:35

## 2017-09-02 RX ADMIN — IPRATROPIUM BROMIDE 0.5 MG: 0.5 SOLUTION RESPIRATORY (INHALATION) at 07:29

## 2017-09-02 RX ADMIN — METOPROLOL TARTRATE 5 MG: 5 INJECTION, SOLUTION INTRAVENOUS at 16:15

## 2017-09-02 RX ADMIN — CEFTRIAXONE 1000 MG: 1 INJECTION, POWDER, FOR SOLUTION INTRAMUSCULAR; INTRAVENOUS at 03:10

## 2017-09-02 RX ADMIN — DILTIAZEM HYDROCHLORIDE 180 MG: 180 CAPSULE, COATED, EXTENDED RELEASE ORAL at 08:10

## 2017-09-02 RX ADMIN — LEVALBUTEROL HYDROCHLORIDE 1.25 MG: 1.25 SOLUTION, CONCENTRATE RESPIRATORY (INHALATION) at 19:29

## 2017-09-02 NOTE — PROGRESS NOTES
[facility] Internal [facility] Progress Note  Patient: Johnathon Shell 80 y o  female   MRN: [id number]  PCP: Shannan Hinton DO  Unit/Bed#: -01 Encounter: [id number]  Date Of Visit: 09/02/17    Assessment:    Principal Problem:    Altered mental status  Active Problems:    Disease of thyroid gland    SOB (shortness of breath)    Atrial fibrillation with RVR    Elevated brain natriuretic peptide (BNP) level    Normocytic anemia    Urinary tract infection    Throat cancer    Sacral ulcer      Plan:    · Altered Mental status secondary to multiple infections with hypercarbic respiratory failure: discussed with daughter, mother was on palliative care at [facility] and had been talking about accepting Hospice care  She has told her daughter through her living will that she would not want to be in a state where she could not do for herself  Patient told me that she dose not want to be a burden to anyone  We talked about the fact that taking care of her would never be a burden to us  She asked if she was going to get better  I asked her if she would be surprised if she did not get better  She stated "no"  We talked about allowing her to have some water for comfort even if it would cause her to have worsening pneumonia  She stated she would want the water and then address the symptoms  We , therefore agreed to comfort care  Please note this decision is being made in conjunction with the patients daughter who would like symptoms to be addressed understanding that her mother may die  The patient has a paranoid dementia but can still express most of her needs  Since the patient is actively aspirating which is what the rapid response earlier would suggest, and patient want to eat and drink what she wants, will discontinue antibioitcs and allow patient to have some water for comfort  I have left liquid morphine for dyspnea and pain and atropine for secretions  We will attempt to give her rate controlling agents by mouth for as long as we can and I will leave lopressor IV for rate control prn  No bipap will be used to correct her Co2, retention  We will not get further ABG which are painful  · Pneumonia, Aspiration: Will be chronic due to previous cancer  Patient refusing to follow modified diet  Eating and drinking is a comfort issue for this patient  Will permit small spoons of water for comfort  Avoid large sips or straws  · Hypercarbic respiratory failure: No bipap  · UTI: daughter desires to discontinue antibiotics   · Comfort measures, no further rapid responses      VTE Pharmacologic Prophylaxis:   Pharmacologic: Pharmacologic VTE Prophylaxis contraindicated due to discontinue for comfort  Mechanical VTE Prophylaxis in Place: Yes    Patient Centered Rounds: I have performed bedside rounds with nursing staff today  Discussions with Specialists or Other Care Team Provider: ICU AP    Education and Discussions with Family / Patient: updated daughter Eugenio Chauhan at bedside    Time Spent for Care: 1 hour  More than 50% of total time spent on counseling and coordination of care as described above  Current Length of Stay: 1 day(s)    Current Patient Status: Inpatient   Certification Statement: The patient will continue to require additional inpatient hospital stay due to symptom control  If stable consider Hospice house    Discharge Plan / Estimated Discharge Date: to be assessed    Code Status: Level 4 - Comfort Care      Subjective:   Patient relates she can not catch her breath  Complains of dry tongue and mouth  Requests water which we provided  Objective:     Vitals:   Temp (24hrs), Av 5 °F (36 9 °C), Min:98 °F (36 7 °C), Max:98 9 °F (37 2 °C)    HR:  [] 101  Resp:  [18] 18  BP: (119-140)/(63-81) 140/63  SpO2:  [90 %-97 %] 90 %  Body mass index is 32 17 kg/m²  Input and Output Summary (last 24 hours):        Intake/Output Summary (Last 24 hours) at 17 1933  Last data filed at 17 0745   Gross per 24 hour   Intake              100 ml   Output                0 ml   Net              100 ml       Physical Exam:     Physical Exam    General: mild distress, secondary to shortness of breath and dry mouth  PERRL EOMI, anicteric, Mm dry  Chest decreased with poor air entry no wheezing  CVS: irreg, irreg s1, s2  Abd: obeses soft , not tender or distended  Ext: no clubbing cyanosis  Neuro: confused and paranoid    Additional Data:     Labs:      Results from last 7 days  Lab Units 09/02/17  0522 09/01/17  0228   WBC Thousand/uL 5 86 8 92   HEMOGLOBIN g/dL 10 1* 10 9*   HEMATOCRIT % 34 1* 36 4   PLATELETS Thousands/uL 158 182   NEUTROS PCT %  --  78*   LYMPHS PCT %  --  13*   MONOS PCT %  --  8   EOS PCT %  --  1       Results from last 7 days  Lab Units 09/02/17  0522   SODIUM mmol/L 140   POTASSIUM mmol/L 4 0   CHLORIDE mmol/L 101   CO2 mmol/L 35*   BUN mg/dL 12   CREATININE mg/dL 0 54*   CALCIUM mg/dL 8 7   TOTAL PROTEIN g/dL 5 9*   BILIRUBIN TOTAL mg/dL 0 30   ALK PHOS U/L 90   ALT U/L 14   AST U/L 12   GLUCOSE RANDOM mg/dL 106           * I Have Reviewed All Lab Data Listed Above  * Additional Pertinent Lab Tests Reviewed: All Labs Within Last 24 Hours Reviewed    Imagine    Imaging Reports Reviewed Today Include: None  Imaging Personally Reviewed by Myself Includes:  None    Recent Cultures (last 7 days):       Results from last 7 days  Lab Units 09/01/17  0359 09/01/17  0244   BLOOD CULTURE  No Growth at 24 hrs    No Growth at 24 hrs   --    URINE CULTURE   --  >100,000 cfu/ml Proteus species  >100,000 cfu/ml Enterococcus species       Last 24 Hours Medication List:     brimonidine-timolol 1 drop Both Eyes Q12H AMADOU   diltiazem 180 mg Oral Daily   docusate sodium 100 mg Oral BID   ipratropium 0 5 mg Nebulization TID   levalbuterol 1 25 mg Nebulization TID   levothyroxine 125 mcg Oral Early Morning   nortriptyline 25 mg Oral HS   nystatin  Topical BID        Today, Patient Was Seen By: Jeronimo Krishnamurthy MD Pager : [phone]

## 2017-09-02 NOTE — PROGRESS NOTES
Patient states "If I die, you'll be the sorriest person in the world"  Patient agitated  Attempted to reorient patient  Patient remains agitated

## 2017-09-02 NOTE — RAPID RESPONSE
Progress Note - Rapid Response   Kendra Rodriguez 80 y o  female MRN: [id number]    Time Called ( Time): 6504  Room#: 840  [facility] Time ( Time): 3211  Event End Time ( Time): 1625  MEWS score at time of Rapid Response: 1  Primary reason for call: Acute change in HR, Acute change in RR, Acute change in SPO2 and Acute change in mental status  Interventions:  Airway/Breathing:  O2 Mask/Nasal, ABG and CXR  Circulation: EKG  Other Treatments: 5 mg of metoprolol x2, 20 mg of Lasix       Assessment:   1  Acute respiratory failure with hypoxia with hypercarbia  2  Rapid atrial fibrillation  3  Dysphagia  4  Urinary tract infection  5  Pulmonary edema    Plan:   · Will trial patient on non-rebreather  Patient has clinical signs of aspiration, risk benefit with positive pressure ventilation  Can follow up with family for goals of care  · EKG demonstrates rapid atrial fibrillation, would recommend starting low dose metoprolol in addition to Cardizem with hold parameters for blood pressure  · Given aspiration event and poor patient insight, would recommend NPO and reassessment by speech, again pending goals of care  · Continue to treat with ceftriaxone/azithromycin/Flagyl as this will cover UTI and possible aspiration pneumonia  · CXR with increasing opacification, likely element of pulmonary edema/vascular congestion, will trial 20 mg of Lasix, follow daily weights and oxygen requirements       HPI/Chief Complaint (Background/Situation):   Kendra Rodriguez is a 80y o  year old female who presents on 5 1 with a complaint of lethargy  Patient is a resident at [facility] and has a past medical history of laryngeal cancer, hypertension, hypothyroidism, atrial fibrillation, and nursing facility reports that patient's urine is darker than normal   She was admitted to a medical-surgical floor and treated for urinary tract infection as well as a possible healthcare associated pneumonia    On 09/02 she was a rapid response secondary to tachypnea, hypoxia, altered mental status, and rapid atrial fibrillation      Historical Information   Past Medical History:   Diagnosis Date    Atrial fibrillation with RVR 8/4/2017    Cancer     throat     Disease of thyroid gland     Dysphagia, oropharyngeal phase     Glaucoma     Hypertension     Larynx cancer      Past Surgical History:   Procedure Laterality Date    APPENDECTOMY      TONSILLECTOMY       Social History   History   Alcohol Use No     History   Drug Use No     History   Smoking Status    Former Smoker   Smokeless Tobacco    Not on file     Family History: non-contributory    Meds/Allergies     aspirin 162 mg Oral Daily   cefTRIAXone 1,000 mg Intravenous Q24H   And      azithromycin 500 mg Oral Q24H   brimonidine-timolol 1 drop Both Eyes Q12H [address]   diltiazem 180 mg Oral Daily   docusate sodium 100 mg Oral BID   enoxaparin 40 mg Subcutaneous Daily   furosemide 20 mg Intravenous Once   ipratropium 0 5 mg Nebulization TID   levalbuterol 1 25 mg Nebulization TID   levothyroxine 125 mcg Oral Early Morning   metoprolol      metoprolol      metroNIDAZOLE 500 mg Intravenous Q8H   nortriptyline 25 mg Oral HS   nystatin  Topical BID            Allergies   Allergen Reactions    Dexamethasone Swelling     Pt c/o "moon face"       ROS: General ROS: positive for  - malaise  ENT ROS: positive for - nasal congestion and dry mucosa  Respiratory ROS: positive for - shortness of breath  Cardiovascular ROS: negative  Neurological ROS: no TIA or stroke symptoms    Physical Exam:  Gen: Awake, arousable, chronically ill-appearing, in acute distress  HEENT: Atraumatic, conjugate gaze, pupils equal, dry mucosa,   Neck: Trachea midline, no JVD evident  Chest: Lung sounds diminished  Cor: Tachycardic, irregular rhythm  Abd: Soft, non-tender  Ext: Atraumatic, 1+ bilateral lower extremity edema  Neuro: GCS E3 V4 M6, patient barely audible, baseline  Skin: Warm, dry      Intake/Output Summary (Last 24 hours) at 09/02/17 1627  Last data filed at 09/02/17 0734   Gross per 24 hour   Intake              580 ml   Output                0 ml   Net              580 ml       Respiratory    Lab Data (Last 4 hours)    None         O2/Vent Data (Last 4 hours)    None              Invasive Devices     Peripheral Intravenous Line            Peripheral IV 09/01/17 Right Antecubital 1 day                DIAGNOSTIC DATA:    Lab: I have personally reviewed pertinent lab results  CBC:     Results from last 7 days  Lab Units 09/02/17  0522   WBC Thousand/uL 5 86   HEMOGLOBIN g/dL 10 1*   HEMATOCRIT % 34 1*   PLATELETS Thousands/uL 158     CMP:     Results from last 7 days  Lab Units 09/02/17  0522 09/01/17  0228   SODIUM mmol/L 140 140   POTASSIUM mmol/L 4 0 4 4   CHLORIDE mmol/L 101 100   CO2 mmol/L 35* 39*   BUN mg/dL 12 14   CREATININE mg/dL 0 54* 0 76   CALCIUM mg/dL 8 7 9 2   TOTAL PROTEIN g/dL 5 9* 6 6   BILIRUBIN TOTAL mg/dL 0 30 0 40   ALK PHOS U/L 90 100   ALT U/L 14 18   AST U/L 12 15   GLUCOSE RANDOM mg/dL 106 105     PT/INR:   No results found for: PT, INR,   Magnesium: No components found for: MAG,   Phosphorous: No results found for: PHOS    Microbiology:  Lab Results   Component Value Date    BLOODCX No Growth at 24 hrs  09/01/2017    BLOODCX No Growth at 24 hrs  09/01/2017    URINECX >100,000 cfu/ml Proteus species 09/01/2017    URINECX >100,000 cfu/ml Enterococcus species 09/01/2017         OUTCOME:   Stayed in room  and Other: discussed with SL provider that patient appropriate for transfer if necessary for bipap, will discuss with family goals of care  Family notified of transfer: Not applicable  Family member contacted: Daughter  Code Status: Level 3 - DNAR and DNI  Critical Care Time: Total Critical Care time spent 31 minutes excluding procedures, teaching and family updates

## 2017-09-02 NOTE — PLAN OF CARE
Problem: Potential for Falls  Goal: Patient will remain free of falls  INTERVENTIONS:  - Assess patient frequently for physical needs  -  Identify cognitive and physical deficits and behaviors that affect risk of falls    -  [place] fall precautions as indicated by assessment   - Educate patient/family on patient safety including physical limitations  - Instruct patient to call for assistance with activity based on assessment  - Modify environment to reduce risk of injury  - Consider OT/PT consult to assist with strengthening/mobility   Outcome: Progressing      Problem: Prexisting or High Potential for Compromised Skin Integrity  Goal: Skin integrity is maintained or improved  INTERVENTIONS:  - Identify patients at risk for skin breakdown  - Assess and monitor skin integrity  - Assess and monitor nutrition and hydration status  - Monitor labs (i e  albumin)  - Assess for incontinence   - Turn and reposition patient  - Assist with mobility/ambulation  - Relieve pressure over bony prominences  - Avoid friction and shearing  - Provide appropriate hygiene as needed including keeping skin clean and dry  - Evaluate need for skin moisturizer/barrier cream  - Collaborate with interdisciplinary team (i e  Nutrition, Rehabilitation, etc )   - Patient/family teaching   Outcome: Progressing      Problem: PAIN - ADULT  Goal: Verbalizes/displays adequate comfort level or baseline comfort level  Interventions:  - Encourage patient to monitor pain and request assistance  - Assess pain using appropriate pain scale  - Administer analgesics based on type and severity of pain and evaluate response  - Implement non-pharmacological measures as appropriate and evaluate response  - Consider cultural and social influences on pain and pain management  - Notify physician/advanced practitioner if interventions unsuccessful or patient reports new pain   Outcome: Progressing      Problem: INFECTION - ADULT  Goal: Absence or prevention of progression during hospitalization  INTERVENTIONS:  - Assess and monitor for signs and symptoms of infection  - Monitor lab/diagnostic results  - Monitor all insertion sites, i e  indwelling lines, tubes, and drains  - Monitor endotracheal (as able) and nasal secretions for changes in amount and color  - [place] appropriate cooling/warming therapies per order  - Administer medications as ordered  - Instruct and encourage patient and family to use good hand hygiene technique  - Identify and instruct in appropriate isolation precautions for identified infection/condition   Outcome: Progressing      Problem: SAFETY ADULT  Goal: Maintain or return to baseline ADL function  INTERVENTIONS:  -  Assess patient's ability to carry out ADLs; assess patient's baseline for ADL function and identify physical deficits which impact ability to perform ADLs (bathing, care of mouth/teeth, toileting, grooming, dressing, etc )  - Assess/evaluate cause of self-care deficits   - Assess range of motion  - Assess patient's mobility; develop plan if impaired  - Assess patient's need for assistive devices and provide as appropriate  - Encourage maximum independence but intervene and supervise when necessary  ¯ Involve family in performance of ADLs  ¯ Assess for home care needs following discharge   ¯ Request OT consult to assist with ADL evaluation and planning for discharge  ¯ Provide patient education as appropriate   Outcome: Progressing    Goal: Maintain or return mobility status to optimal level  INTERVENTIONS:  - Assess patient's baseline mobility status (ambulation, transfers, stairs, etc )    - Identify cognitive and physical deficits and behaviors that affect mobility  - Identify mobility aids required to assist with transfers and/or ambulation (gait belt, sit-to-stand, lift, walker, cane, etc )  - [place] fall precautions as indicated by assessment  - Record patient progress and toleration of activity level on Mobility SBAR; progress patient to next Phase/Stage  - Instruct patient to call for assistance with activity based on assessment  - Request Rehabilitation consult to assist with strengthening/weightbearing, etc    Outcome: Progressing      Problem: DISCHARGE PLANNING  Goal: Discharge to home or other facility with appropriate resources  INTERVENTIONS:  - Identify barriers to discharge w/patient and caregiver  - Arrange for needed discharge resources and transportation as appropriate  - Identify discharge learning needs (meds, wound care, etc )  - Arrange for interpretive services to assist at discharge as needed  - Refer to Case Management Department for coordinating discharge planning if the patient needs post-hospital services based on physician/advanced practitioner order or complex needs related to functional status, cognitive ability, or social support system   Outcome: Progressing      Problem: Knowledge Deficit  Goal: Patient/family/caregiver demonstrates understanding of disease process, treatment plan, medications, and discharge instructions  Complete learning assessment and assess knowledge base    Interventions:  - Provide teaching at level of understanding  - Provide teaching via preferred learning methods   Outcome: Progressing      Problem: DISCHARGE PLANNING - CARE MANAGEMENT  Goal: Discharge to post-acute care or home with appropriate resources  INTERVENTIONS:  - Conduct assessment to determine patient/family and health care team treatment goals, and need for post-acute services based on payer coverage, community resources, and patient preferences, and barriers to discharge  - Address psychosocial, clinical, and financial barriers to discharge as identified in assessment in conjunction with the patient/family and health care team  - Arrange appropriate level of post-acute services according to patient's   needs and preference and payer coverage in collaboration with the physician and health care team  - Communicate with and update the patient/family, physician, and health care team regarding progress on the discharge plan  - Arrange appropriate transportation to post-acute venues   Outcome: Progressing      Problem: Nutrition/Hydration-ADULT  Goal: Nutrient/Hydration intake appropriate for improving, restoring or maintaining nutritional needs  Collaborate with interdisciplinary team and initiate plan and interventions as ordered  Monitor patient's weight and dietary intake as ordered or per policy    Determine patient's food preferences and provide high-protein, high-caloric foods as appropriate       INTERVENTIONS:  - Monitor oral intake, urinary output, labs, and treatment plans  - Assess nutrition and hydration status and recommend course of action  - Evaluate amount of meals eaten  - Assist patient with eating if necessary   - Allow adequate time for meals  - Recommend/ encourage appropriate diets, oral nutritional supplements, and vitamin/mineral supplements  - Order, calculate, and assess calorie counts as needed  - Assess need for intravenous fluids  - Provide specific nutrition/hydration education as appropriate  - Include patient/family/caregiver in decisions related to nutrition   Outcome: Progressing

## 2017-09-02 NOTE — PROGRESS NOTES
Patient c/o difficulty breathing and feeling nauseous  Small amount of bile in bed sheets  On 4L O2, stat is 79%,   Non rebreather applied  Rapid Response called  See RR note for details

## 2017-09-03 LAB
BACTERIA UR CULT: NORMAL
BACTERIA UR CULT: NORMAL

## 2017-09-03 PROCEDURE — 94760 N-INVAS EAR/PLS OXIMETRY 1: CPT

## 2017-09-03 PROCEDURE — 94640 AIRWAY INHALATION TREATMENT: CPT

## 2017-09-03 RX ADMIN — DILTIAZEM HYDROCHLORIDE 180 MG: 180 CAPSULE, COATED, EXTENDED RELEASE ORAL at 10:16

## 2017-09-03 RX ADMIN — LEVALBUTEROL HYDROCHLORIDE 1.25 MG: 1.25 SOLUTION, CONCENTRATE RESPIRATORY (INHALATION) at 14:13

## 2017-09-03 RX ADMIN — DOCUSATE SODIUM 100 MG: 100 CAPSULE, LIQUID FILLED ORAL at 10:16

## 2017-09-03 RX ADMIN — BRIMONIDINE TARTRATE, TIMOLOL MALEATE 1 DROP: 2; 5 SOLUTION/ DROPS OPHTHALMIC at 21:58

## 2017-09-03 RX ADMIN — ONDANSETRON 4 MG: 2 INJECTION INTRAMUSCULAR; INTRAVENOUS at 03:55

## 2017-09-03 RX ADMIN — MORPHINE SULFATE 5 MG: 20 SOLUTION ORAL at 21:45

## 2017-09-03 RX ADMIN — IPRATROPIUM BROMIDE 0.5 MG: 0.5 SOLUTION RESPIRATORY (INHALATION) at 14:13

## 2017-09-03 RX ADMIN — NYSTATIN: 100000 POWDER TOPICAL at 10:17

## 2017-09-03 RX ADMIN — NYSTATIN 1 APPLICATION: 100000 POWDER TOPICAL at 18:52

## 2017-09-03 RX ADMIN — MORPHINE SULFATE 5 MG: 20 SOLUTION ORAL at 14:31

## 2017-09-03 RX ADMIN — MORPHINE SULFATE 5 MG: 20 SOLUTION ORAL at 18:52

## 2017-09-03 RX ADMIN — LEVALBUTEROL HYDROCHLORIDE 1.25 MG: 1.25 SOLUTION, CONCENTRATE RESPIRATORY (INHALATION) at 07:36

## 2017-09-03 RX ADMIN — BRIMONIDINE TARTRATE, TIMOLOL MALEATE 1 DROP: 2; 5 SOLUTION/ DROPS OPHTHALMIC at 10:17

## 2017-09-03 RX ADMIN — MORPHINE SULFATE 5 MG: 20 SOLUTION ORAL at 03:36

## 2017-09-03 RX ADMIN — IPRATROPIUM BROMIDE 0.5 MG: 0.5 SOLUTION RESPIRATORY (INHALATION) at 07:36

## 2017-09-03 NOTE — PHYSICAL THERAPY NOTE
PHYSICAL THERAPY NOTE      Patient Name: Johnathon Shell  RDDKY'[id number] Date: 9/3/2017    Pt's chart reviewed, noted pt is comfort care at this time  PT phoned Dr Mimi Mullins, [facility] attending regarding such who confirmed that PT is not warranted at this time  PT will d/c PT eval/treat order at this time as therapy services are not indicated  Please reconsult if necessary      Ronnie Ma, PT

## 2017-09-03 NOTE — PROGRESS NOTES
[facility] Progress Note  Patient: Onelia Demarco 80 y o  female   MRN: [id number]  PCP: Renetta Roberts DO  Unit/Bed#: -01 Encounter: [id number]  Date Of Visit: 09/03/17    Assessment:    Principal Problem:    Altered mental status  Active Problems:    Disease of thyroid gland    SOB (shortness of breath)    Atrial fibrillation with RVR    Elevated brain natriuretic peptide (BNP) level    Normocytic anemia    Urinary tract infection    Throat cancer    Sacral ulcer      Plan:    · AMS due to UTI and pneumonia: patient actively aspirating she requests to eat for comfort  Patient and family requested comfort care  Continue prn Morphine for air hunger  Comfort feeding  · Shortness of breath: due to aspiration  Continue nebs, prn morphine  · Throat Cancer- aspiration related to previous surgeries  · Sacral Ulcer: not assessed today  If patient stabilizes consider hospice consult early in the weak, patient's spouse used services in [place]      VTE Pharmacologic Prophylaxis:   Pharmacologic: Pharmacologic VTE Prophylaxis contraindicated due to discontinued for comfort  Mechanical VTE Prophylaxis in Place: Yes    Patient Centered Rounds: I have performed bedside rounds with nursing staff today  Discussions with Specialists or Other Care Team Provider: none    Education and Discussions with Family / Patient: none    Time Spent for Care: 35 min  More than 50% of total time spent on counseling and coordination of care as described above  Current Length of Stay: 2 day(s)    Current Patient Status: Inpatient   Certification Statement: The patient will continue to require additional inpatient hospital stay due to symptom management at the end of life    Discharge Plan / Estimated Discharge Date: none    Code Status: Level 4 - Comfort Care      Subjective:   Patient reports wants some watermelon  She asks why we can't just give her a shot and make it all over   She admits to recognizing that she is dying and appears more at peace than yesterday  Objective:     Vitals:   Temp (24hrs), Av 8 °F (37 1 °C), Min:97 5 °F (36 4 °C), Max:99 6 °F (37 6 °C)    HR:  [101-130] 121  Resp:  [17-20] 17  BP: (106-146)/(55-81) 146/68  SpO2:  [79 %-97 %] 97 %  Body mass index is 32 24 kg/m²  Input and Output Summary (last 24 hours): Intake/Output Summary (Last 24 hours) at 17 1155  Last data filed at 17 0741   Gross per 24 hour   Intake              200 ml   Output                0 ml   Net              200 ml       Physical Exam:     Physical Exam  General: no acute distress but noted to be fading in and out of obtundation  PERRL, EOMI, mm dry, hoarse throat  Chest: bilateral rhonchi, no wheezing  CVS: distant, tachy,S1, S2  Abd: obese, soft, nt, nd  Ext: edema    Additional Data:     Labs:      Results from last 7 days  Lab Units 17  0522 17  0228   WBC Thousand/uL 5 86 8 92   HEMOGLOBIN g/dL 10 1* 10 9*   HEMATOCRIT % 34 1* 36 4   PLATELETS Thousands/uL 158 182   NEUTROS PCT %  --  78*   LYMPHS PCT %  --  13*   MONOS PCT %  --  8   EOS PCT %  --  1       Results from last 7 days  Lab Units 17  0522   SODIUM mmol/L 140   POTASSIUM mmol/L 4 0   CHLORIDE mmol/L 101   CO2 mmol/L 35*   BUN mg/dL 12   CREATININE mg/dL 0 54*   CALCIUM mg/dL 8 7   TOTAL PROTEIN g/dL 5 9*   BILIRUBIN TOTAL mg/dL 0 30   ALK PHOS U/L 90   ALT U/L 14   AST U/L 12   GLUCOSE RANDOM mg/dL 106           * I Have Reviewed All Lab Data Listed Above  * Additional Pertinent Lab Tests Reviewed: No New Labs Available For Today    Imaging:    Imaging Reports Reviewed Today Include: none  Imaging Personally Reviewed by Myself Includes:  none    Recent Cultures (last 7 days):       Results from last 7 days  Lab Units 17  0359 17  0244   BLOOD CULTURE  No Growth at 48 hrs  No Growth at 48 hrs    --    URINE CULTURE   --  >100,000 cfu/ml Proteus mirabilis  >100,000 cfu/ml Enterococcus faecalis Last 24 Hours Medication List:     brimonidine-timolol 1 drop Both Eyes Q12H [address]   diltiazem 180 mg Oral Daily   docusate sodium 100 mg Oral BID   ipratropium 0 5 mg Nebulization TID   levalbuterol 1 25 mg Nebulization TID   levothyroxine 125 mcg Oral Early Morning   nortriptyline 25 mg Oral HS   nystatin  Topical BID        Today, Patient Was Seen By: Mohan Fitzpatrick MD Pager : [phone]

## 2017-09-03 NOTE — SPEECH THERAPY NOTE
Speech-Language Pathology    Per discussion with MD, pt/family have elected to pursue comfort care, and pt's diet has been upgraded to regular and thin liquids  Given change in goals of care, will sign off at this time  If new concerns arise, or if pt/family require further assistance, please reconsult       Nela SPRINGER  [id number] [facility]  Speech-Language Pathologist  Phone [phone]  Pager [phone]

## 2017-09-04 PROCEDURE — 94640 AIRWAY INHALATION TREATMENT: CPT

## 2017-09-04 PROCEDURE — 94760 N-INVAS EAR/PLS OXIMETRY 1: CPT

## 2017-09-04 RX ORDER — PROMETHAZINE HYDROCHLORIDE 25 MG/ML
12.5 INJECTION, SOLUTION INTRAMUSCULAR; INTRAVENOUS EVERY 4 HOURS PRN
Status: DISCONTINUED | OUTPATIENT
Start: 2017-09-04 | End: 2017-09-05 | Stop reason: HOSPADM

## 2017-09-04 RX ADMIN — MORPHINE SULFATE 5 MG: 20 SOLUTION ORAL at 21:14

## 2017-09-04 RX ADMIN — MORPHINE SULFATE 5 MG: 20 SOLUTION ORAL at 02:45

## 2017-09-04 RX ADMIN — BRIMONIDINE TARTRATE, TIMOLOL MALEATE 1 DROP: 2; 5 SOLUTION/ DROPS OPHTHALMIC at 09:09

## 2017-09-04 RX ADMIN — NYSTATIN: 100000 POWDER TOPICAL at 09:08

## 2017-09-04 RX ADMIN — LEVALBUTEROL HYDROCHLORIDE 1.25 MG: 1.25 SOLUTION, CONCENTRATE RESPIRATORY (INHALATION) at 15:27

## 2017-09-04 RX ADMIN — IPRATROPIUM BROMIDE 0.5 MG: 0.5 SOLUTION RESPIRATORY (INHALATION) at 15:27

## 2017-09-04 RX ADMIN — BRIMONIDINE TARTRATE, TIMOLOL MALEATE 1 DROP: 2; 5 SOLUTION/ DROPS OPHTHALMIC at 21:14

## 2017-09-04 RX ADMIN — MORPHINE SULFATE 5 MG: 20 SOLUTION ORAL at 12:10

## 2017-09-04 RX ADMIN — NORTRIPTYLINE HYDROCHLORIDE 25 MG: 25 CAPSULE ORAL at 21:15

## 2017-09-04 RX ADMIN — ONDANSETRON 4 MG: 2 INJECTION INTRAMUSCULAR; INTRAVENOUS at 08:42

## 2017-09-04 RX ADMIN — MORPHINE SULFATE 5 MG: 20 SOLUTION ORAL at 16:10

## 2017-09-04 RX ADMIN — PROMETHAZINE HYDROCHLORIDE 12.5 MG: 25 INJECTION INTRAMUSCULAR; INTRAVENOUS at 12:08

## 2017-09-04 RX ADMIN — MORPHINE SULFATE 5 MG: 20 SOLUTION ORAL at 08:49

## 2017-09-04 NOTE — PROGRESS NOTES
[facility] Progress Note  Patient: Johnathon Shell 80 y o  female   MRN: [id number]  PCP: Shannan Hinton DO  Unit/Bed#: -[facility] Encounter: [id number]  Date Of Visit: 09/04/17    Assessment:    Principal Problem:    Altered mental status  Active Problems:    Disease of thyroid gland    SOB (shortness of breath)    Atrial fibrillation with RVR    Elevated brain natriuretic peptide (BNP) level    Normocytic anemia    Urinary tract infection    Throat cancer    Sacral ulcer      Plan:    · Aspiration pneumonia:  Patient chronically aspirating related to her previous throat surgery  She refuses to use a modified diet  During her stay with us she had several rapid responses do to aspiration with hypoxemia and respiratory distress  Her daughter has made the decision to allow her to eat what she wishes knowing that she will ultimately succumbed to her condition  Patient is intermittently oriented  She will  remember names and latch on to them  She is excessively paranoid about people trying to kill her in particular treated disoriented about her daughter Radha Dowell trying to kill her by smothering her  She also feels that Breonna Nicole is keeping her other daughter away from her when in reality the daughter and mother have not spoken for greater than 10 years  I will attempt to communicate with her daughter about possible hospice home placement to control her psychological symptoms as well as her physical symptoms of dyspnea and pain  · Urinary tract infection  He can antibiotics have been discontinued we will permit this to run its natural course  Cultures growing Proteus and Enterococcus  · Continue morphine for air hunger, please use the alternative name Alyx code own S the patient has a mental block related to morphine    She has used oxycodone in the past but this is not available in the hospital   Oxy IR can be prescribed at discharge as she has utilized at successfully in the past   · Sacral ulcer please see nursing note again not visualized  · Afib with RVR: patient remains able to take pills so giving diltiazem for now po  With respiratory distress RVR worsens and she did have flash pulmonary edema  VTE Pharmacologic Prophylaxis:   Pharmacologic: Pharmacologic VTE Prophylaxis contraindicated due to Discontinued for comfort purposes  Mechanical VTE Prophylaxis in Place: Yes    Patient Centered Rounds: I have performed bedside rounds with nursing staff today  Discussions with Specialists or Other Care Team Provider:  None    Education and Discussions with Family / Patient:  Left message for patient's daughter will communicate in a m  Time Spent for Care: 35 minutes  More than 50% of total time spent on counseling and coordination of care as described above  Current Length of Stay: 3 day(s)    Current Patient Status: Inpatient   Certification Statement: The patient will continue to require additional inpatient hospital stay due to Symptom management at the end of life    Discharge Plan / Estimated Discharge Date:  Reassess in a m  for possible hospice placement will discuss with daughter    Code Status: Level 4 - Comfort Care      Subjective:   Patient is persistently eating and drinking and choking on her food  She continues to warn need to watch her she is trying to kill her  Objective:     Vitals:   Temp (24hrs), Av 8 °F (36 6 °C), Min:97 7 °F (36 5 °C), Max:97 9 °F (36 6 °C)    HR:  [115-118] 118  Resp:  [18] 18  BP: (112-147)/(80-87) 112/87  SpO2:  [94 %-95 %] 95 %  Body mass index is 32 24 kg/m²  Input and Output Summary (last 24 hours): Intake/Output Summary (Last 24 hours) at 17 1359  Last data filed at 17 0849   Gross per 24 hour   Intake              120 ml   Output                0 ml   Net              120 ml       Physical Exam:     Physical Exam    General obese female in mild distress secondary to aspirating    Pupils equal round and reactive to light, extraocular muscles intact mucous membranes are moist neck is supple there is no JVD no lymph nodes no carotid bruits chest is decreased with coarse rhonchi bilaterally  Cardiovascular irregularly irregular positive S1 and S2 no S3-S4 murmur or gallop  Abdomen obese soft nontender nondistended  Extremities show trace edema  Neurologically the patient is oriented to self only she is paranoid and delusional about people trying to kill her  Additional Data:     Labs:      Results from last 7 days  Lab Units 09/02/17  0522 09/01/17  0228   WBC Thousand/uL 5 86 8 92   HEMOGLOBIN g/dL 10 1* 10 9*   HEMATOCRIT % 34 1* 36 4   PLATELETS Thousands/uL 158 182   NEUTROS PCT %  --  78*   LYMPHS PCT %  --  13*   MONOS PCT %  --  8   EOS PCT %  --  1       Results from last 7 days  Lab Units 09/02/17  0522   SODIUM mmol/L 140   POTASSIUM mmol/L 4 0   CHLORIDE mmol/L 101   CO2 mmol/L 35*   BUN mg/dL 12   CREATININE mg/dL 0 54*   CALCIUM mg/dL 8 7   TOTAL PROTEIN g/dL 5 9*   BILIRUBIN TOTAL mg/dL 0 30   ALK PHOS U/L 90   ALT U/L 14   AST U/L 12   GLUCOSE RANDOM mg/dL 106           * I Have Reviewed All Lab Data Listed Above  * Additional Pertinent Lab Tests Reviewed: No New Labs Available For Today    Imaging:    Imaging Reports Reviewed Today Include:  None  Imaging Personally Reviewed by Myself Includes:  None    Recent Cultures (last 7 days):       Results from last 7 days  Lab Units 09/01/17  0359 09/01/17  0244   BLOOD CULTURE  No Growth at 72 hrs    No Growth at 72 hrs   --    URINE CULTURE   --  >100,000 cfu/ml Proteus mirabilis  >100,000 cfu/ml Enterococcus faecalis       Last 24 Hours Medication List:     brimonidine-timolol 1 drop Both Eyes Q12H [facility]   diltiazem 180 mg Oral Daily   docusate sodium 100 mg Oral BID   ipratropium 0 5 mg Nebulization TID   levalbuterol 1 25 mg Nebulization TID   levothyroxine 125 mcg Oral Early Morning   nortriptyline 25 mg Oral HS   nystatin  Topical BID        Today, Patient Was Seen By: Sebas Velez MD Pager : [phone]

## 2017-09-05 VITALS
SYSTOLIC BLOOD PRESSURE: 143 MMHG | TEMPERATURE: 98.1 F | HEART RATE: 118 BPM | HEIGHT: 64 IN | BODY MASS INDEX: 30.75 KG/M2 | RESPIRATION RATE: 20 BRPM | WEIGHT: 180.12 LBS | DIASTOLIC BLOOD PRESSURE: 77 MMHG | OXYGEN SATURATION: 95 %

## 2017-09-05 LAB
ATRIAL RATE: 144 BPM
QRS AXIS: 112 DEGREES
QRSD INTERVAL: 106 MS
QT INTERVAL: 286 MS
QTC INTERVAL: 398 MS
T WAVE AXIS: 13 DEGREES
VENTRICULAR RATE: 117 BPM

## 2017-09-05 PROCEDURE — 94760 N-INVAS EAR/PLS OXIMETRY 1: CPT

## 2017-09-05 PROCEDURE — 94640 AIRWAY INHALATION TREATMENT: CPT

## 2017-09-05 RX ORDER — LEVALBUTEROL 1.25 MG/.5ML
1.25 SOLUTION, CONCENTRATE RESPIRATORY (INHALATION)
Status: DISCONTINUED | OUTPATIENT
Start: 2017-09-05 | End: 2017-09-05

## 2017-09-05 RX ORDER — MORPHINE SULFATE 100 MG/5ML
5 SOLUTION ORAL
Qty: 15 ML | Refills: 0 | Status: SHIPPED | OUTPATIENT
Start: 2017-09-05 | End: 2017-09-15

## 2017-09-05 RX ORDER — LEVALBUTEROL 1.25 MG/.5ML
1.25 SOLUTION, CONCENTRATE RESPIRATORY (INHALATION)
Status: DISCONTINUED | OUTPATIENT
Start: 2017-09-05 | End: 2017-09-05 | Stop reason: HOSPADM

## 2017-09-05 RX ORDER — ATROPINE SULFATE 10 MG/ML
2 SOLUTION/ DROPS OPHTHALMIC EVERY 4 HOURS PRN
Qty: 2 ML | Refills: 0 | Status: SHIPPED | OUTPATIENT
Start: 2017-09-05

## 2017-09-05 RX ORDER — BISACODYL 10 MG
10 SUPPOSITORY, RECTAL RECTAL DAILY PRN
Qty: 12 SUPPOSITORY | Refills: 0 | Status: SHIPPED | OUTPATIENT
Start: 2017-09-05

## 2017-09-05 RX ORDER — ACETAMINOPHEN 650 MG/1
650 SUPPOSITORY RECTAL EVERY 4 HOURS PRN
Qty: 10 SUPPOSITORY | Refills: 0 | Status: SHIPPED | OUTPATIENT
Start: 2017-09-05

## 2017-09-05 RX ADMIN — MORPHINE SULFATE 5 MG: 20 SOLUTION ORAL at 18:57

## 2017-09-05 RX ADMIN — IPRATROPIUM BROMIDE 0.5 MG: 0.5 SOLUTION RESPIRATORY (INHALATION) at 08:18

## 2017-09-05 RX ADMIN — IPRATROPIUM BROMIDE 0.5 MG: 0.5 SOLUTION RESPIRATORY (INHALATION) at 14:36

## 2017-09-05 RX ADMIN — LEVALBUTEROL HYDROCHLORIDE 1.25 MG: 1.25 SOLUTION, CONCENTRATE RESPIRATORY (INHALATION) at 08:18

## 2017-09-05 RX ADMIN — NYSTATIN: 100000 POWDER TOPICAL at 18:27

## 2017-09-05 RX ADMIN — NYSTATIN: 100000 POWDER TOPICAL at 08:45

## 2017-09-05 RX ADMIN — BRIMONIDINE TARTRATE, TIMOLOL MALEATE 1 DROP: 2; 5 SOLUTION/ DROPS OPHTHALMIC at 13:21

## 2017-09-05 RX ADMIN — LEVALBUTEROL HYDROCHLORIDE 1.25 MG: 1.25 SOLUTION, CONCENTRATE RESPIRATORY (INHALATION) at 14:36

## 2017-09-05 NOTE — PLAN OF CARE
Problem: DISCHARGE PLANNING - CARE MANAGEMENT  Goal: Discharge to post-acute care or home with appropriate resources  INTERVENTIONS:  - Conduct assessment to determine patient/family and health care team treatment goals, and need for post-acute services based on payer coverage, community resources, and patient preferences, and barriers to discharge  - Address psychosocial, clinical, and financial barriers to discharge as identified in assessment in conjunction with the patient/family and health care team  - Arrange appropriate level of post-acute services according to patient's   needs and preference and payer coverage in collaboration with the physician and health care team  - Communicate with and update the patient/family, physician, and health care team regarding progress on the discharge plan  - Arrange appropriate transportation to post-acute venues   Outcome: Completed Date Met: 09/05/17  Pt has been eval by [facility] and was accepted for admission  SLET set up to [facility]- 9/5/17 at 7pm for transport  IMM reviewed  Questions answered  Copy to pt and copy to MR for scanning

## 2017-09-05 NOTE — PROGRESS NOTES
Patient found with nasal cannula off face and at bottom of bed  VS obtained, patient repositioned with HOB at 45 degrees  Patient unresponsive  Sternal chest rub performed, patient still unresponsive  SLIM notified  Instructed to increase nasal cannula to 10 L/min  Will continue to monitor

## 2017-09-05 NOTE — PLAN OF CARE
Problem: DISCHARGE PLANNING - CARE MANAGEMENT  Goal: Discharge to post-acute care or home with appropriate resources  INTERVENTIONS:  - Conduct assessment to determine patient/family and health care team treatment goals, and need for post-acute services based on payer coverage, community resources, and patient preferences, and barriers to discharge  - Address psychosocial, clinical, and financial barriers to discharge as identified in assessment in conjunction with the patient/family and health care team  - Arrange appropriate level of post-acute services according to patient's   needs and preference and payer coverage in collaboration with the physician and health care team  - Communicate with and update the patient/family, physician, and health care team regarding progress on the discharge plan  - Arrange appropriate transportation to post-acute venues   Outcome: Completed Date Met: 09/05/17  Pt has been eval by [facility] and was accepted for admission  SLET set up to [facility]- 9/5/17 at 7pm for transport  IMM reviewed  Questions answered  Copy to pt and copy to MR for scanning

## 2017-09-05 NOTE — OCCUPATIONAL THERAPY NOTE
Occupational Therapy         Patient Name: Leigha Ortiz  LMIXW'Y Date: 9/5/2017      OT orders received 9/1/17, patient screened for OT services  Patient currently on comfort care, no further OT evaluation is required at this time    DC OT     Kristen Sánchez MS, OTR/L

## 2017-09-05 NOTE — PLAN OF CARE
Problem: Nutrition/Hydration-ADULT  Goal: Nutrient/Hydration intake appropriate for improving, restoring or maintaining nutritional needs  Collaborate with interdisciplinary team and initiate plan and interventions as ordered  Monitor patient's weight and dietary intake as ordered or per policy    Determine patient's food preferences and provide high-protein, high-caloric foods as appropriate       INTERVENTIONS:  - Monitor oral intake, urinary output, labs, and treatment plans  - Assess nutrition and hydration status and recommend course of action  - Evaluate amount of meals eaten  - Assist patient with eating if necessary   - Allow adequate time for meals  - Recommend/ encourage appropriate diets, oral nutritional supplements, and vitamin/mineral supplements  - Order, calculate, and assess calorie counts as needed  - Assess need for intravenous fluids  - Provide specific nutrition/hydration education as appropriate  - Include patient/family/caregiver in decisions related to nutrition   Outcome: Not Progressing

## 2017-09-05 NOTE — DISCHARGE SUMMARY
Discharge Summary - [address] [facility]    Patient Information: Immanuel Orellana 80 y o  female MRN: [id number]  Unit/Bed#: -01 Encounter: [id number]    Discharging Physician / Practitioner: Kaveh Angel MD  PCP: Malik Roberts DO  Admission Date: 9/1/2017  Discharge Date: 09/05/17    Reason for Admission: Altered mental status    Discharge Diagnoses:     Principal Problem:  ·   Altered mental status: secondary to hypercarbic respiratory failure due to aspiration pneumonia and a proteus/enterococcus urinary track infection  Patient has been off antibiotics since this last weekend  She is coughing up thick purulent sputum  Active Problems:  ·   Disease of thyroid gland: can discontinue thyroid medication at anytime  ·   SOB (shortness of breath): secondary to CopD and aspiration pneumonia  Patient experiencing Hypercarbic respiratory failure due to acute on chronic aspiration   Patient had an overlay of  ChF during most recent aspiration event when her heart rate became rapid in response to her distress  ·   Atrial fibrillation with RVR:continue cardizem po as long as able to take by mouth  For comfort  ·    Urinary tract infection: Proteus and enterococcus  Not treating further  ·   Throat cancer: comfort feeding  ·   Sacral ulcer: present on admission  Two small open ulcers on the right and left upper sacrum    Comfort Care       Consultations During Hospital Stay:  · None     Procedures Performed:     · None    Significant Findings / Test Results:     · None    Incidental Findings:   · None     Test Results Pending at Discharge (will require follow up): · None     Outpatient Tests Requested:  · None    Complications:  None    Hospital Course:     Immanuel Orellana is a 80 y o  female patient who originally presented to the hospital on 9/1/2017 due to confusion  Patient was sent from [facility] where she was noted to be confused, then aggressive, then less responsive   She was diagnosed with RLL pneumonia and a UTI  Patient refuses a modified diet and during her hosptial course had a rapid response secondary to aspiration event that caused her afib to have increased RVR and likely assoicated flash pulmonary edema( clarified as acute pulmonary edema)  We treated her with lasix, nebs and antibiotics  The patient does not have full capacity for decision making but a times is aware of what is happening and can contribute to her basic choices  Her daughter and I discussed the chronic recurrent nature of her pneumonia, since she is refusing to abide by the modified diet recommendations  Her daughter elected to initiate full comfort, letting her eat and drink as she pleases  She had a repeat episode of respiratory distress , she was treated with MSIR with improved symptoms  She had periods of minimal responsiveness and periods of agitation related to her paranoid ideation  I offered her daughter a referral for [facility] hospice home as I believe without treatment with antibioitcs and with continued oral intake , this patient will succumb to her illness in days to week  Condition at Discharge: poor     Discharge Day Visit / Exam:     Subjective:  Patient states people are trying to put pillows over her face  Vitals: Blood Pressure: 143/77 (09/05/17 0700)  Pulse: (!) 118 (09/05/17 0700)  Temperature: 98 1 °F (36 7 °C) (09/05/17 0700)  Temp Source: Oral (09/05/17 0700)  Respirations: 20 (09/05/17 0700)  Height: 5' 4" (162 6 cm) (09/01/17 0629)  Weight - Scale: 81 7 kg (180 lb 1 9 oz) (09/05/17 0600)  SpO2: 95 % (09/05/17 1436)  Exam:   Physical Exam: Patient is frankly paranoid, talking about people trying to kill her in particular her daughter    Coughed up thick brown, tan sputum   Chest bilateral basilar rhonchi, slight end expiratory wheeze  CVS: irreg, irreg, S1, S2  Abd: soft, not tender, not distended, obese  Sacral decub not visualized, present on admission, per notes  Ext: edema ankles  Neuro : oriented to self, paranoid, no judgement or insight    Discharge instructions/Information to patient and family:   See after visit summary for information provided to patient and family  Provisions for Follow-Up Care:  See after visit summary for information related to follow-up care and any pertinent home health orders  Disposition:     Other: [address] [facility]:   · Not Applicable to this Patient - Not Applicable to this Patient    Planned Readmission: NOne     Discharge Statement:  I spent less than 30 minutes discharging the patient  This time was spent on the day of discharge  I had direct contact with the patient on the day of discharge  Greater than 50% of the total time was spent examining patient, answering all patient questions, arranging and discussing plan of care with patient as well as directly providing post-discharge instructions  Additional time then spent on discharge activities  Discharge Medications:  See after visit summary for reconciled discharge medications provided to patient and family

## 2017-09-05 NOTE — PLAN OF CARE
Problem: Potential for Falls  Goal: Patient will remain free of falls  INTERVENTIONS:  - Assess patient frequently for physical needs  -  Identify cognitive and physical deficits and behaviors that affect risk of falls    -  [place] fall precautions as indicated by assessment   - Educate patient/family on patient safety including physical limitations  - Instruct patient to call for assistance with activity based on assessment  - Modify environment to reduce risk of injury  - Consider OT/PT consult to assist with strengthening/mobility   Outcome: Progressing      Problem: Prexisting or High Potential for Compromised Skin Integrity  Goal: Skin integrity is maintained or improved  INTERVENTIONS:  - Identify patients at risk for skin breakdown  - Assess and monitor skin integrity  - Assess and monitor nutrition and hydration status  - Monitor labs (i e  albumin)  - Assess for incontinence   - Turn and reposition patient  - Assist with mobility/ambulation  - Relieve pressure over bony prominences  - Avoid friction and shearing  - Provide appropriate hygiene as needed including keeping skin clean and dry  - Evaluate need for skin moisturizer/barrier cream  - Collaborate with interdisciplinary team (i e  Nutrition, Rehabilitation, etc )   - Patient/family teaching   Outcome: Progressing      Problem: PAIN - ADULT  Goal: Verbalizes/displays adequate comfort level or baseline comfort level  Interventions:  - Encourage patient to monitor pain and request assistance  - Assess pain using appropriate pain scale  - Administer analgesics based on type and severity of pain and evaluate response  - Implement non-pharmacological measures as appropriate and evaluate response  - Consider cultural and social influences on pain and pain management  - Notify physician/advanced practitioner if interventions unsuccessful or patient reports new pain   Outcome: Progressing      Problem: INFECTION - ADULT  Goal: Absence or prevention of progression during hospitalization  INTERVENTIONS:  - Assess and monitor for signs and symptoms of infection  - Monitor lab/diagnostic results  - Monitor all insertion sites, i e  indwelling lines, tubes, and drains  - Monitor endotracheal (as able) and nasal secretions for changes in amount and color  - [place] appropriate cooling/warming therapies per order  - Administer medications as ordered  - Instruct and encourage patient and family to use good hand hygiene technique  - Identify and instruct in appropriate isolation precautions for identified infection/condition   Outcome: Progressing      Problem: SAFETY ADULT  Goal: Maintain or return to baseline ADL function  INTERVENTIONS:  -  Assess patient's ability to carry out ADLs; assess patient's baseline for ADL function and identify physical deficits which impact ability to perform ADLs (bathing, care of mouth/teeth, toileting, grooming, dressing, etc )  - Assess/evaluate cause of self-care deficits   - Assess range of motion  - Assess patient's mobility; develop plan if impaired  - Assess patient's need for assistive devices and provide as appropriate  - Encourage maximum independence but intervene and supervise when necessary  ¯ Involve family in performance of ADLs  ¯ Assess for home care needs following discharge   ¯ Request OT consult to assist with ADL evaluation and planning for discharge  ¯ Provide patient education as appropriate   Outcome: Progressing    Goal: Maintain or return mobility status to optimal level  INTERVENTIONS:  - Assess patient's baseline mobility status (ambulation, transfers, stairs, etc )    - Identify cognitive and physical deficits and behaviors that affect mobility  - Identify mobility aids required to assist with transfers and/or ambulation (gait belt, sit-to-stand, lift, walker, cane, etc )  - [place] fall precautions as indicated by assessment  - Record patient progress and toleration of activity level on Mobility SBAR; progress patient to next Phase/Stage  - Instruct patient to call for assistance with activity based on assessment  - Request Rehabilitation consult to assist with strengthening/weightbearing, etc    Outcome: Progressing      Problem: DISCHARGE PLANNING  Goal: Discharge to home or other facility with appropriate resources  INTERVENTIONS:  - Identify barriers to discharge w/patient and caregiver  - Arrange for needed discharge resources and transportation as appropriate  - Identify discharge learning needs (meds, wound care, etc )  - Arrange for interpretive services to assist at discharge as needed  - Refer to Case Management Department for coordinating discharge planning if the patient needs post-hospital services based on physician/advanced practitioner order or complex needs related to functional status, cognitive ability, or social support system   Outcome: Progressing      Problem: Knowledge Deficit  Goal: Patient/family/caregiver demonstrates understanding of disease process, treatment plan, medications, and discharge instructions  Complete learning assessment and assess knowledge base    Interventions:  - Provide teaching at level of understanding  - Provide teaching via preferred learning methods   Outcome: Progressing      Problem: DISCHARGE PLANNING - CARE MANAGEMENT  Goal: Discharge to post-acute care or home with appropriate resources  INTERVENTIONS:  - Conduct assessment to determine patient/family and health care team treatment goals, and need for post-acute services based on payer coverage, community resources, and patient preferences, and barriers to discharge  - Address psychosocial, clinical, and financial barriers to discharge as identified in assessment in conjunction with the patient/family and health care team  - Arrange appropriate level of post-acute services according to patient's   needs and preference and payer coverage in collaboration with the physician and health care team  - Communicate with and update the patient/family, physician, and health care team regarding progress on the discharge plan  - Arrange appropriate transportation to post-acute venues   Outcome: Progressing      Problem: Nutrition/Hydration-ADULT  Goal: Nutrient/Hydration intake appropriate for improving, restoring or maintaining nutritional needs  Collaborate with interdisciplinary team and initiate plan and interventions as ordered  Monitor patient's weight and dietary intake as ordered or per policy    Determine patient's food preferences and provide high-protein, high-caloric foods as appropriate       INTERVENTIONS:  - Monitor oral intake, urinary output, labs, and treatment plans  - Assess nutrition and hydration status and recommend course of action  - Evaluate amount of meals eaten  - Assist patient with eating if necessary   - Allow adequate time for meals  - Recommend/ encourage appropriate diets, oral nutritional supplements, and vitamin/mineral supplements  - Order, calculate, and assess calorie counts as needed  - Assess need for intravenous fluids  - Provide specific nutrition/hydration education as appropriate  - Include patient/family/caregiver in decisions related to nutrition   Outcome: Progressing

## 2017-09-05 NOTE — PLAN OF CARE
Problem: DISCHARGE PLANNING - CARE MANAGEMENT  Goal: Discharge to post-acute care or home with appropriate resources  INTERVENTIONS:  - Conduct assessment to determine patient/family and health care team treatment goals, and need for post-acute services based on payer coverage, community resources, and patient preferences, and barriers to discharge  - Address psychosocial, clinical, and financial barriers to discharge as identified in assessment in conjunction with the patient/family and health care team  - Arrange appropriate level of post-acute services according to patient's   needs and preference and payer coverage in collaboration with the physician and health care team  - Communicate with and update the patient/family, physician, and health care team regarding progress on the discharge plan  - Arrange appropriate transportation to post-acute venues   Outcome: Progressing  Dr Ellis Lopez informs CM that pt's  family is interested in [facility] instead of returning to [facility] w/hospice  Dr Ellis Lopez would also like to speak to Hospice herself and gives permission for them to call her cell number  CM spoke to Bernardo and she will have someone call Dr Ellis Lopez and she will also keep CM informed of the status

## 2017-09-05 NOTE — SOCIAL WORK
Pt has been eval by [facility] and was accepted for admission  SLET set up to [facility]- 9/5/17 at 7pm for transport  IMM reviewed  Questions answered  Copy to pt and copy to MR for scanning

## 2017-09-05 NOTE — SOCIAL WORK
Dr Ronny Randolph informs CM that pt's  family is interested in [facility] instead of returning to [facility] w/hospice  Dr Ronny Randolph would also like to speak to Hospice herself and gives permission for them to call her cell number  CM spoke to Bernardo and she will have someone call Dr Ronny Randolph and she will also keep CM informed of the status

## 2017-09-05 NOTE — PLAN OF CARE
Problem: DISCHARGE PLANNING - CARE MANAGEMENT  Goal: Discharge to post-acute care or home with appropriate resources  INTERVENTIONS:  - Conduct assessment to determine patient/family and health care team treatment goals, and need for post-acute services based on payer coverage, community resources, and patient preferences, and barriers to discharge  - Address psychosocial, clinical, and financial barriers to discharge as identified in assessment in conjunction with the patient/family and health care team  - Arrange appropriate level of post-acute services according to patient's   needs and preference and payer coverage in collaboration with the physician and health care team  - Communicate with and update the patient/family, physician, and health care team regarding progress on the discharge plan  - Arrange appropriate transportation to post-acute venues   Outcome: Progressing  CM spoke to [facility] from [address] [place] [address] sent through [facility] and will be reviewed  [facility] will keep CM informed of status and will also speak to the family

## 2017-09-05 NOTE — PLAN OF CARE
DISCHARGE PLANNING     Discharge to home or other facility with appropriate resources Adequate for Discharge        DISCHARGE PLANNING - CARE MANAGEMENT     Discharge to post-acute care or home with appropriate resources Adequate for Discharge        INFECTION - ADULT     Absence or prevention of progression during hospitalization Adequate for Discharge        Knowledge Deficit     Patient/family/caregiver demonstrates understanding of disease process, treatment plan, medications, and discharge instructions Adequate for Discharge        Nutrition/Hydration-ADULT     Nutrient/Hydration intake appropriate for improving, restoring or maintaining nutritional needs Adequate for Discharge        PAIN - ADULT     Verbalizes/displays adequate comfort level or baseline comfort level Adequate for Discharge        Potential for Falls     Patient will remain free of falls Adequate for Discharge        Prexisting or High Potential for Compromised Skin Integrity     Skin integrity is maintained or improved Adequate for Discharge        SAFETY ADULT     Maintain or return to baseline ADL function Adequate for Discharge     Maintain or return mobility status to optimal level Adequate for Discharge

## 2017-09-05 NOTE — HOSPICE NOTE
Pt approved for hospice acute general inpatient level of care with hospice diagnosis of respiratory failure with hypoxia and hypercapnea secondary to aspiration in the setting of sepsis  Pt has uti and had two aspiration events during current hospitalization  Case reviewed with Dr Calvin Wesley, and Dr Kyler Woods attending as well as nurse Shikha Carter and SHAKILA Bolton  Transportation arrangements made for 7 PM tonight  Liaison to meet with daughter and get consents signed prior to transport

## 2017-09-05 NOTE — SOCIAL WORK
CM spoke to [facility] from [address] [place] [address]  Info sent through [facility] and will be reviewed  [facility] will keep CM informed of status and will also speak to the family

## 2017-09-06 PROBLEM — J96.02 ACUTE RESPIRATORY FAILURE WITH HYPOXIA AND HYPERCAPNIA (HCC): Status: ACTIVE | Noted: 2017-09-06

## 2017-09-06 PROBLEM — J96.01 ACUTE RESPIRATORY FAILURE WITH HYPOXIA AND HYPERCAPNIA (HCC): Status: ACTIVE | Noted: 2017-09-06

## 2017-09-06 LAB
BACTERIA BLD CULT: NORMAL
BACTERIA BLD CULT: NORMAL

## 2018-01-12 VITALS
OXYGEN SATURATION: 94 % | BODY MASS INDEX: 32.59 KG/M2 | RESPIRATION RATE: 20 BRPM | SYSTOLIC BLOOD PRESSURE: 112 MMHG | WEIGHT: 184 LBS | DIASTOLIC BLOOD PRESSURE: 84 MMHG | TEMPERATURE: 98.6 F | HEART RATE: 101 BPM

## 2018-01-13 VITALS
OXYGEN SATURATION: 93 % | HEART RATE: 101 BPM | SYSTOLIC BLOOD PRESSURE: 132 MMHG | RESPIRATION RATE: 18 BRPM | TEMPERATURE: 97.6 F | DIASTOLIC BLOOD PRESSURE: 82 MMHG

## 2018-01-13 VITALS
DIASTOLIC BLOOD PRESSURE: 72 MMHG | HEART RATE: 100 BPM | SYSTOLIC BLOOD PRESSURE: 114 MMHG | TEMPERATURE: 97.8 F | OXYGEN SATURATION: 96 % | RESPIRATION RATE: 16 BRPM

## 2018-01-14 VITALS
HEART RATE: 102 BPM | TEMPERATURE: 98.3 F | DIASTOLIC BLOOD PRESSURE: 66 MMHG | OXYGEN SATURATION: 92 % | RESPIRATION RATE: 16 BRPM | WEIGHT: 181 LBS | BODY MASS INDEX: 32.06 KG/M2 | SYSTOLIC BLOOD PRESSURE: 116 MMHG

## 2018-01-14 VITALS
TEMPERATURE: 98.1 F | OXYGEN SATURATION: 93 % | RESPIRATION RATE: 16 BRPM | HEART RATE: 107 BPM | SYSTOLIC BLOOD PRESSURE: 118 MMHG | DIASTOLIC BLOOD PRESSURE: 62 MMHG

## 2018-01-14 VITALS
TEMPERATURE: 98.4 F | RESPIRATION RATE: 20 BRPM | DIASTOLIC BLOOD PRESSURE: 62 MMHG | SYSTOLIC BLOOD PRESSURE: 100 MMHG | OXYGEN SATURATION: 93 % | HEART RATE: 111 BPM

## 2018-01-16 NOTE — MISCELLANEOUS
Message  Patient's daughter Javad Lawler called with concerns about Juani's facial swelling  She has been on steroids for about 2 months, and thinks that her swollen face is related to the steroids  She describes it as "puffy" and "without wrinkles"  She has no change in breathing or swallowing  We advised to decrease her dexamethasone from 2mg PO BID to 2mg once daily  We will touch base again in 4 days to assess her symptoms        Signatures   Electronically signed by : ANNA Campbell ; Jun 1 2017  3:00PM EST                       (Author)    Electronically signed by : ANNA Bowman ; Jul 26 2017 10:25AM EST                       (Author)